# Patient Record
Sex: FEMALE | Race: WHITE | NOT HISPANIC OR LATINO | Employment: OTHER | URBAN - METROPOLITAN AREA
[De-identification: names, ages, dates, MRNs, and addresses within clinical notes are randomized per-mention and may not be internally consistent; named-entity substitution may affect disease eponyms.]

---

## 2020-09-21 ENCOUNTER — APPOINTMENT (OUTPATIENT)
Dept: PREADMISSION TESTING | Facility: HOSPITAL | Age: 65
End: 2020-09-21

## 2020-09-21 PROCEDURE — U0004 COV-19 TEST NON-CDC HGH THRU: HCPCS

## 2020-09-21 PROCEDURE — C9803 HOPD COVID-19 SPEC COLLECT: HCPCS

## 2020-09-22 LAB — SARS-COV-2 RNA NOSE QL NAA+PROBE: NOT DETECTED

## 2021-01-05 ENCOUNTER — OFFICE VISIT (OUTPATIENT)
Dept: ORTHOPEDIC SURGERY | Facility: CLINIC | Age: 66
End: 2021-01-05

## 2021-01-05 VITALS — BODY MASS INDEX: 38.95 KG/M2 | HEIGHT: 63 IN | OXYGEN SATURATION: 96 % | HEART RATE: 80 BPM | WEIGHT: 219.8 LBS

## 2021-01-05 DIAGNOSIS — M25.571 RIGHT ANKLE PAIN, UNSPECIFIED CHRONICITY: Primary | ICD-10-CM

## 2021-01-05 DIAGNOSIS — M19.171 POST-TRAUMATIC ARTHRITIS OF ANKLE, RIGHT: ICD-10-CM

## 2021-01-05 DIAGNOSIS — T84.84XA PAINFUL ORTHOPAEDIC HARDWARE (HCC): ICD-10-CM

## 2021-01-05 DIAGNOSIS — R25.2 MUSCLE CRAMPING: ICD-10-CM

## 2021-01-05 PROCEDURE — 99204 OFFICE O/P NEW MOD 45 MIN: CPT | Performed by: PHYSICIAN ASSISTANT

## 2021-01-05 RX ORDER — OMEPRAZOLE 20 MG/1
20 CAPSULE, DELAYED RELEASE ORAL DAILY
Status: ON HOLD | COMMUNITY
End: 2022-06-27

## 2021-01-05 RX ORDER — IRBESARTAN 75 MG/1
75 TABLET ORAL NIGHTLY
COMMUNITY
End: 2023-03-31

## 2021-01-05 RX ORDER — QUETIAPINE FUMARATE 25 MG/1
75 TABLET, FILM COATED ORAL NIGHTLY
COMMUNITY

## 2021-01-05 RX ORDER — PERPHENAZINE 16 MG
TABLET ORAL
COMMUNITY
End: 2021-01-05

## 2021-01-05 RX ORDER — GABAPENTIN 300 MG/1
300 CAPSULE ORAL 3 TIMES DAILY
Status: ON HOLD | COMMUNITY
End: 2022-06-27

## 2021-01-05 RX ORDER — ATORVASTATIN CALCIUM 10 MG/1
10 TABLET, FILM COATED ORAL DAILY
COMMUNITY

## 2021-01-05 NOTE — PROGRESS NOTES
[facility] Note    Subjective     Patient: Perla Adair  : 1955    Primary Care Provider: Provider, No Known    Requesting Provider: As above    Pain of the Left Ankle, Pain of the Left Foot, and Pain of the Right Ankle      History    Chief Complaint: Right ankle pain and left ankle and foot pain    History of Present Illness: This very pleasant 65-year-old female presenting today to discuss her right ankle pain and left foot and ankle and calf pain and cramping.  She is status post ORIF right ankle fracture in [address] in 2016.  She reports that the hardware bothers her.  She also complains of generalized ankle pain with weightbearing and walking.  No rest pain or pain.  She describes it as aching.  She denies any swelling warmth or erythema.  She is here for further evaluation.  She has had no prior treatment.    .  She also complains of left foot ankle and calf cramp cramping.  She is status post left posterior tib tendon repair in 2016 in [facility].  She reports that this began several months ago.  It bothers her most when she is lying in bed at night.  She has to get up and walk around for it to resolve.  She denies any swelling.  She reports that she feels like her foot is pulled me medially and if she stretches it helps to resolve it.  She is here for further evaluation    Current Outpatient Medications on File Prior to Visit   Medication Sig Dispense Refill   • atorvastatin (LIPITOR) 10 MG tablet Take 10 mg by mouth Daily.     • Escitalopram Oxalate (LEXAPRO PO) Take  by mouth.     • gabapentin (NEURONTIN) 300 MG capsule Take 300 mg by mouth 3 (Three) Times a Day.     • irbesartan (AVAPRO) 75 MG tablet Take 75 mg by mouth Every Night.     • METHOCARBAMOL PO Take  by mouth.     • omeprazole (priLOSEC) 20 MG capsule Take 20 mg by mouth Daily.     • PROPRANOLOL HCL ER PO Take  by mouth.     • QUEtiapine (SEROquel) 25 MG tablet Take 25 mg by mouth Every  Night.       No current facility-administered medications on file prior to visit.       Allergies   Allergen Reactions   • Adhesive Tape Rash   • Amoxicillin-Pot Clavulanate Nausea And Vomiting   • Butorphanol Other (See Comments)     Chest pain   • Codeine Other (See Comments)     Chest pain     • Morphine Other (See Comments)     Chest pain      History reviewed. No pertinent past medical history.  Past Surgical History:   Procedure Laterality Date   • ANKLE OPEN REDUCTION INTERNAL FIXATION Bilateral    • APPENDECTOMY     • CHOLECYSTECTOMY     • FINGER SURGERY      Re attachment Left thumb   • HYSTERECTOMY     • JOINT REPLACEMENT Left     knee   • LUNG LOBECTOMY      right upper portion   • SHOULDER SURGERY      RCR - 3 Right  1 left     Family History   Problem Relation Age of Onset   • Cancer Mother    • Diabetes Mother    • Hypertension Mother    • Heart attack Mother       Social History     Socioeconomic History   • Marital status: Unknown     Spouse name: Not on file   • Number of children: Not on file   • Years of education: Not on file   • Highest education level: Not on file   Tobacco Use   • Smoking status: Former Smoker     Packs/day: 0.25     Years: 20.00     Pack years: 5.00     Types: Cigarettes     Start date:      Quit date:      Years since quittin.0   • Smokeless tobacco: Never Used   Substance and Sexual Activity   • Alcohol use: Yes     Comment: twice a month   • Drug use: Never   • Sexual activity: Defer        Review of Systems   Constitutional: Negative.         Night sweats   HENT: Positive for dental problem and tinnitus.    Eyes: Positive for visual disturbance.   Respiratory: Positive for apnea (CPAP), shortness of breath and wheezing.    Cardiovascular: Positive for leg swelling.   Gastrointestinal: Negative.    Endocrine: Positive for cold intolerance.   Genitourinary: Negative.    Musculoskeletal: Positive for arthralgias and back pain.   Skin: Negative.   "  Allergic/Immunologic: Negative.    Neurological: Positive for headaches.   Hematological: Negative.    Psychiatric/Behavioral: Positive for sleep disturbance.        PTSD       The following portions of the patient's history were reviewed and updated as appropriate: allergies, current medications, past family history, past medical history, past social history, past surgical history and problem list.      Objective      Physical Exam  Pulse 80   Ht 160 cm (63\")   Wt 99.7 kg (219 lb 12.8 oz)   SpO2 96%   BMI 38.94 kg/m²     Body mass index is 38.94 kg/m².    GENERAL: Body habitus: obese    Lower extremity edema: Left: 1+ pitting; Right: 1+ pitting    Varicose veins:  Left: mild; Right: mild    Gait: antalgic     Mental Status:  awake and alert; oriented to person, place, and time    Voice:  clear  SKIN:  Normal    Hair Growth:  Right:normal; Left:  normal  HEENT: Head: Normocephalic, atraumatic,  without obvious abnormality.  eye: normal external eye, no icterus   PULM:  Repiratory effort normal    Ortho Exam  V:  Dorsalis Pedis:  Right: 2+; Left:2+    Posterior Tibial: Right:2+; Left:2+    Capillary Refill:  Brisk  MSK:      Tibia:  Right:  non tender; Left:  non tender      Ankle:  Right: tender Over the prominent lateral hardware with well-healed incision.  She has no joint tenderness.  Reasonable range of motion 5/5 motor strength; Left:  non tender and With well-healed incision.  Nontender with no swelling or erythema.      Foot:  Right:  non tender; Left:  tender over the origin of the plantar fascia    [place] 5.07 monofilament test: not evaluated    Lower extremity sensation: intact     Calf Atrophy:none    Motor Function: all 5/5          Medical Decision Making    Data Review:   ordered and reviewed x-rays today and reviewed outside records    Assessment:  1. Right ankle pain, unspecified chronicity    2. Muscle cramping    3. Painful orthopaedic hardware (CMS/HCC)    4. Post-traumatic " arthritis of ankle, right        Plan:  1.  Painful orthopedic hardware status post ORIF right ankle fracture in 2016.  I reviewed today's x-rays and clinical findings with the patient.  X-rays today show demonstrate patient is status post ORIF right ankle fracture with fibular plate and tight rope placed for syndesmotic injury.  Ankle mortise is symmetric.  There is evidence of small periarticular osteophytes indicative of posttraumatic arthritis of the ankle.  No acute bony pathology.  She reports she would like to have the ankle hardware removed.  She will return to see Dr. Narvaez in several weeks should further discuss hardware removal as well as follow-up for her muscle cramping and mild arthritis.    2.  Mild right posttraumatic arthritis.  X-rays show evidence of mild arthritis.  I think this is what is causing her generalized ankle pain and aching when she is weightbearing and walking.  There is no evidence of any acute bony findings.  We discussed further treatment options including anti-inflammatories and eventually possible steroid injection if becomes painful enough.  She does not want any further treatment at this point.    3.  Left muscle cramping in the lower extremity and plantar fasciitis.  Patient is nontender with normal range of motion.  Patient is complaining of painful cramping at night that seems to improve with getting up and stretching.  I encouraged her to continue with stretching of her calf and her feet.  I have also given her the 3 stretches to do for plantar fasciitis encouraging her to stretch as much as possible.  She will also begin using a night splint.  Return to see Dr. Narvaez in 4 to 6 weeks to see how she has progressed or sooner if needed.    History, diagnosis and treatment plan discussed with Dr. Narvaez.          Laure Leslie PA-C  01/07/21  13:38 EST

## 2022-05-09 ENCOUNTER — OFFICE VISIT (OUTPATIENT)
Dept: NEUROLOGY | Facility: CLINIC | Age: 67
End: 2022-05-09

## 2022-05-09 DIAGNOSIS — G44.229 CHRONIC TENSION-TYPE HEADACHE, NOT INTRACTABLE: ICD-10-CM

## 2022-05-09 DIAGNOSIS — G43.719 INTRACTABLE CHRONIC MIGRAINE WITHOUT AURA AND WITHOUT STATUS MIGRAINOSUS: Primary | ICD-10-CM

## 2022-05-09 PROCEDURE — 99204 OFFICE O/P NEW MOD 45 MIN: CPT | Performed by: PSYCHIATRY & NEUROLOGY

## 2022-05-09 RX ORDER — ATOGEPANT 60 MG/1
60 TABLET ORAL DAILY
Qty: 30 TABLET | Refills: 6 | Status: SHIPPED | OUTPATIENT
Start: 2022-05-09 | End: 2022-06-08

## 2022-05-09 RX ORDER — NAPHAZOLINE HCL 0.012 %
2 DROPS OPHTHALMIC (EYE) EVERY 6 HOURS PRN
COMMUNITY

## 2022-05-09 RX ORDER — FEXOFENADINE HCL 180 MG/1
180 TABLET ORAL DAILY
COMMUNITY

## 2022-05-09 RX ORDER — OMEPRAZOLE 40 MG/1
40 CAPSULE, DELAYED RELEASE ORAL 2 TIMES DAILY
COMMUNITY

## 2022-05-09 RX ORDER — FROVATRIPTAN SUCCINATE 2.5 MG/1
2.5 TABLET, FILM COATED ORAL ONCE AS NEEDED
Qty: 10 TABLET | Refills: 3 | Status: SHIPPED | OUTPATIENT
Start: 2022-05-09

## 2022-05-09 RX ORDER — VENLAFAXINE HYDROCHLORIDE 225 MG/1
225 TABLET, EXTENDED RELEASE ORAL
COMMUNITY
End: 2023-03-31

## 2022-05-09 RX ORDER — ESCITALOPRAM OXALATE 20 MG/1
20 TABLET ORAL DAILY
COMMUNITY
Start: 2022-05-08

## 2022-05-09 NOTE — PROGRESS NOTES
Subjective:    CC: Perla Adair is seen today in consultation at the request of JOHNATHAN Manzanares for Migraine (Triggered by weather and stress/Located all over head, not specific to any one area)       HPI:  Patient is a 66-year-old female with history of migraines and tension headaches referred to the clinic to establish care for migraine management.  She reports that she has had migraines since age 10.  She reports that she has tried several migraine preventative treatments including amitriptyline, nortriptyline, Topamax, Depakote, Aimovig, Ajovy, Emgality but none of them have helped.  She has also tried Botox x3 which actually made her migraines worse.  She currently reports on an average 15 migraine days in a month.  In addition to this, she has nonmigraine type tension headaches occurring almost on a daily basis.  She reports that she has associated photophobia, phonophobia and nausea associated with migraines.  Nonmigraine type tension headaches are mild to moderate in intensity and she usually wakes up with this type of headache.  She usually can carry on with her routine with this type of headache.  She is currently taking Maxalt 10 mg as needed as an abortive treatment which sometimes works and sometimes does not.  She has tried and failed Imitrex, Relpax, Zomig.  She has also tried and failed Ubrelvy and Nurtec.  She reports that she was given some samples of Qulipta 60 mg to be taken every day which she tried for a month and it did help reduce migraine intensity and frequency however insurance did not approve Qulipta.    The following portions of the patient's history were reviewed today and updated as of 05/09/2022  : allergies, social history and problem list.  This document will be scanned to patient's chart.      Current Outpatient Medications:   •  aspirin-acetaminophen-caffeine (Excedrin Extra Strength) 250-250-65 MG per tablet, Excedrin Extra Strength, Disp: , Rfl:   •  atorvastatin (LIPITOR)  10 MG tablet, Take 10 mg by mouth Daily., Disp: , Rfl:   •  escitalopram (LEXAPRO) 20 MG tablet, 20 mg., Disp: , Rfl:   •  Escitalopram Oxalate (LEXAPRO PO), Take  by mouth., Disp: , Rfl:   •  fexofenadine (ALLEGRA) 180 MG tablet, Allergy Relief (fexofenadine) 180 mg tablet, Disp: , Rfl:   •  gabapentin (NEURONTIN) 300 MG capsule, Take 300 mg by mouth 3 (Three) Times a Day., Disp: , Rfl:   •  irbesartan (AVAPRO) 75 MG tablet, Take 75 mg by mouth Every Night., Disp: , Rfl:   •  METHOCARBAMOL PO, Take  by mouth., Disp: , Rfl:   •  omeprazole (priLOSEC) 20 MG capsule, Take 20 mg by mouth Daily., Disp: , Rfl:   •  omeprazole (priLOSEC) 40 MG capsule, Prilosec 40 mg capsule,delayed release  Take 1 capsule every day by oral route., Disp: , Rfl:   •  PROPRANOLOL HCL ER PO, Take  by mouth., Disp: , Rfl:   •  QUEtiapine (SEROquel) 25 MG tablet, Take 25 mg by mouth Every Night., Disp: , Rfl:   •  venlafaxine 225 MG tablet sustained-release 24 hour 24 hr tablet, venlafaxine  mg tablet,extended release 24 hr  TAKE 1 TABLET DAILY, Disp: , Rfl:   •  Atogepant (Qulipta) 60 MG tablet, Take 1 tablet by mouth Daily for 30 days., Disp: 30 tablet, Rfl: 6  •  frovatriptan (Frova) 2.5 MG tablet, Take 1 tablet by mouth 1 (One) Time As Needed for Migraine for up to 30 doses. If recurs, may repeat after 2 hours. Max of 3 tabs in 24 hours., Disp: 10 tablet, Rfl: 3   Past Medical History:   Diagnosis Date   • Arthritis    • Asthma    • CTS (carpal tunnel syndrome)    • Depression    • Dizziness    • Fractures    • Headache, tension-type    • Hx of bladder problems    • Hypertension    • Lung cancer (HCC) 05/2016   • Migraine    • Shingles    • Sleep apnea    • Stomach problems    • Syncope       Past Surgical History:   Procedure Laterality Date   • ANKLE OPEN REDUCTION INTERNAL FIXATION Bilateral    • APPENDECTOMY     • CHOLECYSTECTOMY     • FINGER SURGERY      Re attachment Left thumb   • HYSTERECTOMY     • JOINT REPLACEMENT Left      knee   • LUNG LOBECTOMY      right upper portion   • SHOULDER SURGERY      RCR - 3 Right  1 left      Family History   Problem Relation Age of Onset   • Cancer Mother    • Diabetes Mother    • Hypertension Mother    • Heart attack Mother    • Colon cancer Mother    • Heart disease Mother    • Neuropathy Mother    • Obesity Mother    • Alcohol abuse Sister    • Colon cancer Sister    • Dementia Sister    • Heart disease Sister    • Migraine headaches Sister    • Obesity Sister    • Osteosclerosis Sister    • Alcohol abuse Brother    • Alzheimer's disease Brother    • Stomach cancer Brother    • Dementia Brother    • Heart disease Brother    • Mental illness Brother    • Neuropathy Brother    • Obesity Brother    • Suicidality Brother       Review of Systems    All other systems reviewed and are negative     Objective:    There were no vitals taken for this visit.    Neurology Exam:    General apperance: NAD.     Mental status: Alert, awake and oriented to time place and person.    Recent and Remote memory: Can recall 3/3 objects at 5 minutes. Can recall historical events.     Attention span and Concentration: Serial 7s: Normal.     Fund of knowledge:  Normal.     Language and Speech: No aphasia or dysarthria.    Naming , Repitition and Comprehension:  Can name objects, repeat a sentence and follow commands. Speech is clear and fluent with good repetition, comprehension, and naming.    Cranial Nerves:   CN II: Visual fields are full. Intact. Fundi - Normal, No papillederma, Pupils - TL  CN III, IV and VI: Extraocular movements are intact. Normal saccades.   CN V: Facial sensation is intact.   CN VII: Muscles of facial expression reveal no asymmetry. Intact.   CN VIII: Hearing is intact. Whispered voice intact.   CN IX and X: Palate elevates symmetrically. Intact  CN XI: Shoulder shrug is intact.   CN XII: Tongue is midline without evidence of atrophy or fasciculation.     Motor:  Right UE muscle strength 5/5.  Normal tone.     Left UE muscle strength 5/5. Normal tone.      Right LE muscle strength5/5. Normal tone.     Left LE muscle strength 5/5. Normal tone.      Sensory: Normal light touch, vibration and pinprick sensation bilaterally.    DTRs: 2+ bilaterally in upper and lower extremities.    Babinski: Negative bilaterally.    Co-ordination: Normal finger-to-nose, heel to shin B/L.    Rhomberg: Negative.    Gait: Normal.    Cardiovascular: Regular rate and rhythm without murmur, gallop or rub.    Ophthalmoscopic exam: Normal fundi, no papilledema.    Assessment and Plan:  1. Intractable chronic migraine without aura and without status migrainosus  2. Chronic tension-type headache, not intractable  Patient with long-term history of migraines.  She has chronic migraines and chronic tension headaches.  She has tried and failed multiple oral putative treatments including amitriptyline, nortriptyline, Topamax, Depakote.  She has tried and failed monthly Aimovig, Ajovy and Emgality injections as well.  She has tried and failed Botox in the past.  He was given a 1 month sample of Qulipta recently and it did help however insurance did not approve it.  I am going to resend the prescription to [employer] for approval.  It is medically necessary for patient to be on Qulipta as she has tried and failed all conventional migraine preventative treatments.  I also prescribed frovatriptan as she has tried and failed Imitrex, Maxalt, Zomig, Ubrelvy and Nurtec for migraine abortive treatment.  She is currently on Seroquel 50 mg daily which helps sometimes with sleep and sometimes it does not.  I have advised her to talk to her primary care physician with regards to this and if it can be increased to 75 mg nightly dose then it may help her sleep consistently well at night and in turn reduce daily tension type headaches.  I will plan to see her back in clinic in 6 weeks for follow-up.    Return in about 6 weeks (around 6/20/2022).      Alvarez Morrison MD

## 2022-05-11 ENCOUNTER — PRIOR AUTHORIZATION (OUTPATIENT)
Dept: NEUROLOGY | Facility: CLINIC | Age: 67
End: 2022-05-11

## 2022-05-17 ENCOUNTER — TRANSCRIBE ORDERS (OUTPATIENT)
Dept: LAB | Facility: HOSPITAL | Age: 67
End: 2022-05-17

## 2022-05-17 ENCOUNTER — LAB (OUTPATIENT)
Dept: LAB | Facility: HOSPITAL | Age: 67
End: 2022-05-17

## 2022-05-17 DIAGNOSIS — M25.512 LEFT SHOULDER PAIN, UNSPECIFIED CHRONICITY: ICD-10-CM

## 2022-05-17 DIAGNOSIS — M25.512 LEFT SHOULDER PAIN, UNSPECIFIED CHRONICITY: Primary | ICD-10-CM

## 2022-05-17 DIAGNOSIS — Z47.1 AFTERCARE FOLLOWING JOINT REPLACEMENT SURGERY, UNSPECIFIED JOINT: ICD-10-CM

## 2022-05-17 LAB
BASOPHILS # BLD AUTO: 0.1 10*3/MM3 (ref 0–0.2)
BASOPHILS NFR BLD AUTO: 0.9 % (ref 0–1.5)
CRP SERPL-MCNC: 2.94 MG/DL (ref 0–0.5)
DEPRECATED RDW RBC AUTO: 46.4 FL (ref 37–54)
EOSINOPHIL # BLD AUTO: 0.14 10*3/MM3 (ref 0–0.4)
EOSINOPHIL NFR BLD AUTO: 1.2 % (ref 0.3–6.2)
ERYTHROCYTE [DISTWIDTH] IN BLOOD BY AUTOMATED COUNT: 14.3 % (ref 12.3–15.4)
ERYTHROCYTE [SEDIMENTATION RATE] IN BLOOD: 41 MM/HR (ref 0–30)
HCT VFR BLD AUTO: 40.9 % (ref 34–46.6)
HGB BLD-MCNC: 13.5 G/DL (ref 12–15.9)
IMM GRANULOCYTES # BLD AUTO: 0.03 10*3/MM3 (ref 0–0.05)
IMM GRANULOCYTES NFR BLD AUTO: 0.3 % (ref 0–0.5)
LYMPHOCYTES # BLD AUTO: 2.47 10*3/MM3 (ref 0.7–3.1)
LYMPHOCYTES NFR BLD AUTO: 21.6 % (ref 19.6–45.3)
MCH RBC QN AUTO: 29.8 PG (ref 26.6–33)
MCHC RBC AUTO-ENTMCNC: 33 G/DL (ref 31.5–35.7)
MCV RBC AUTO: 90.3 FL (ref 79–97)
MONOCYTES # BLD AUTO: 1.02 10*3/MM3 (ref 0.1–0.9)
MONOCYTES NFR BLD AUTO: 8.9 % (ref 5–12)
NEUTROPHILS NFR BLD AUTO: 67.1 % (ref 42.7–76)
NEUTROPHILS NFR BLD AUTO: 7.66 10*3/MM3 (ref 1.7–7)
NRBC BLD AUTO-RTO: 0 /100 WBC (ref 0–0.2)
PLATELET # BLD AUTO: 341 10*3/MM3 (ref 140–450)
PMV BLD AUTO: 9.7 FL (ref 6–12)
RBC # BLD AUTO: 4.53 10*6/MM3 (ref 3.77–5.28)
WBC NRBC COR # BLD: 11.42 10*3/MM3 (ref 3.4–10.8)

## 2022-05-17 PROCEDURE — 85025 COMPLETE CBC W/AUTO DIFF WBC: CPT

## 2022-05-17 PROCEDURE — 85652 RBC SED RATE AUTOMATED: CPT

## 2022-05-17 PROCEDURE — 36415 COLL VENOUS BLD VENIPUNCTURE: CPT

## 2022-05-17 PROCEDURE — 86140 C-REACTIVE PROTEIN: CPT

## 2022-05-24 ENCOUNTER — TRANSCRIBE ORDERS (OUTPATIENT)
Dept: ADMINISTRATIVE | Facility: HOSPITAL | Age: 67
End: 2022-05-24

## 2022-05-24 DIAGNOSIS — Z96.612 PRESENCE OF LEFT ARTIFICIAL SHOULDER JOINT: Primary | ICD-10-CM

## 2022-05-31 ENCOUNTER — HOSPITAL ENCOUNTER (OUTPATIENT)
Dept: GENERAL RADIOLOGY | Facility: HOSPITAL | Age: 67
Discharge: HOME | End: 2022-05-31
Admitting: RADIOLOGY

## 2022-05-31 DIAGNOSIS — Z96.612 PRESENCE OF LEFT ARTIFICIAL SHOULDER JOINT: ICD-10-CM

## 2022-05-31 LAB
APPEARANCE FLD: ABNORMAL
COLOR FLD: ABNORMAL
CRYSTALS FLD MICRO: NORMAL
EOSINOPHIL NFR FLD MANUAL: 1 %
LYMPHOCYTES NFR FLD MANUAL: 24 %
MONOCYTES NFR FLD: 5 %
NEUTROPHILS NFR FLD MANUAL: 70 %
RBC # FLD AUTO: ABNORMAL /MM3
WBC # FLD AUTO: 23 /MM3

## 2022-05-31 PROCEDURE — 87102 FUNGUS ISOLATION CULTURE: CPT | Performed by: ORTHOPAEDIC SURGERY

## 2022-05-31 PROCEDURE — 89060 EXAM SYNOVIAL FLUID CRYSTALS: CPT | Performed by: ORTHOPAEDIC SURGERY

## 2022-05-31 PROCEDURE — 87205 SMEAR GRAM STAIN: CPT | Performed by: ORTHOPAEDIC SURGERY

## 2022-05-31 PROCEDURE — 89051 BODY FLUID CELL COUNT: CPT | Performed by: ORTHOPAEDIC SURGERY

## 2022-05-31 PROCEDURE — 87075 CULTR BACTERIA EXCEPT BLOOD: CPT | Performed by: ORTHOPAEDIC SURGERY

## 2022-05-31 PROCEDURE — 87206 SMEAR FLUORESCENT/ACID STAI: CPT | Performed by: ORTHOPAEDIC SURGERY

## 2022-05-31 PROCEDURE — 77002 NEEDLE LOCALIZATION BY XRAY: CPT

## 2022-05-31 PROCEDURE — 87070 CULTURE OTHR SPECIMN AEROBIC: CPT | Performed by: ORTHOPAEDIC SURGERY

## 2022-05-31 PROCEDURE — 0 LIDOCAINE 1 % SOLUTION: Performed by: PHYSICIAN ASSISTANT

## 2022-05-31 PROCEDURE — 87015 SPECIMEN INFECT AGNT CONCNTJ: CPT | Performed by: ORTHOPAEDIC SURGERY

## 2022-05-31 PROCEDURE — 87116 MYCOBACTERIA CULTURE: CPT | Performed by: ORTHOPAEDIC SURGERY

## 2022-05-31 RX ORDER — LIDOCAINE HYDROCHLORIDE 10 MG/ML
5 INJECTION, SOLUTION INFILTRATION; PERINEURAL ONCE
Status: COMPLETED | OUTPATIENT
Start: 2022-05-31 | End: 2022-05-31

## 2022-05-31 RX ADMIN — LIDOCAINE HYDROCHLORIDE 5 ML: 10 INJECTION, SOLUTION INFILTRATION; PERINEURAL at 09:01

## 2022-06-05 LAB
BACTERIA FLD CULT: NORMAL
GRAM STN SPEC: NORMAL
GRAM STN SPEC: NORMAL

## 2022-06-12 LAB — BACTERIA SPEC ANAEROBE CULT: ABNORMAL

## 2022-06-15 ENCOUNTER — TRANSCRIBE ORDERS (OUTPATIENT)
Dept: ADMINISTRATIVE | Facility: HOSPITAL | Age: 67
End: 2022-06-15

## 2022-06-15 DIAGNOSIS — S42.102A: Primary | ICD-10-CM

## 2022-06-17 ENCOUNTER — PRE-ADMISSION TESTING (OUTPATIENT)
Dept: PREADMISSION TESTING | Facility: HOSPITAL | Age: 67
End: 2022-06-17

## 2022-06-17 ENCOUNTER — HOSPITAL ENCOUNTER (OUTPATIENT)
Dept: GENERAL RADIOLOGY | Facility: HOSPITAL | Age: 67
Discharge: HOME | End: 2022-06-17

## 2022-06-17 ENCOUNTER — HOSPITAL ENCOUNTER (OUTPATIENT)
Dept: CT IMAGING | Facility: HOSPITAL | Age: 67
Discharge: HOME | End: 2022-06-17

## 2022-06-17 VITALS — BODY MASS INDEX: 38.7 KG/M2 | WEIGHT: 210.32 LBS | HEIGHT: 62 IN

## 2022-06-17 DIAGNOSIS — S42.102A: ICD-10-CM

## 2022-06-17 LAB
ANION GAP SERPL CALCULATED.3IONS-SCNC: 8 MMOL/L (ref 5–15)
BUN SERPL-MCNC: 17 MG/DL (ref 8–23)
BUN/CREAT SERPL: 23.3 (ref 7–25)
CALCIUM SPEC-SCNC: 9.7 MG/DL (ref 8.6–10.5)
CHLORIDE SERPL-SCNC: 104 MMOL/L (ref 98–107)
CO2 SERPL-SCNC: 28 MMOL/L (ref 22–29)
CREAT SERPL-MCNC: 0.73 MG/DL (ref 0.57–1)
CRP SERPL-MCNC: 2.81 MG/DL (ref 0–0.5)
DEPRECATED RDW RBC AUTO: 46.6 FL (ref 37–54)
EGFRCR SERPLBLD CKD-EPI 2021: 91.4 ML/MIN/1.73
ERYTHROCYTE [DISTWIDTH] IN BLOOD BY AUTOMATED COUNT: 14.6 % (ref 12.3–15.4)
ERYTHROCYTE [SEDIMENTATION RATE] IN BLOOD: 30 MM/HR (ref 0–30)
GLUCOSE SERPL-MCNC: 84 MG/DL (ref 65–99)
HBA1C MFR BLD: 5.1 % (ref 4.8–5.6)
HCT VFR BLD AUTO: 41 % (ref 34–46.6)
HGB BLD-MCNC: 13.8 G/DL (ref 12–15.9)
MCH RBC QN AUTO: 29.6 PG (ref 26.6–33)
MCHC RBC AUTO-ENTMCNC: 33.7 G/DL (ref 31.5–35.7)
MCV RBC AUTO: 88 FL (ref 79–97)
PLATELET # BLD AUTO: 285 10*3/MM3 (ref 140–450)
PMV BLD AUTO: 10.2 FL (ref 6–12)
POTASSIUM SERPL-SCNC: 4.5 MMOL/L (ref 3.5–5.2)
QT INTERVAL: 396 MS
QTC INTERVAL: 433 MS
RBC # BLD AUTO: 4.66 10*6/MM3 (ref 3.77–5.28)
SODIUM SERPL-SCNC: 140 MMOL/L (ref 136–145)
WBC NRBC COR # BLD: 10.37 10*3/MM3 (ref 3.4–10.8)

## 2022-06-17 PROCEDURE — 36415 COLL VENOUS BLD VENIPUNCTURE: CPT

## 2022-06-17 PROCEDURE — 71046 X-RAY EXAM CHEST 2 VIEWS: CPT

## 2022-06-17 PROCEDURE — 73200 CT UPPER EXTREMITY W/O DYE: CPT

## 2022-06-17 PROCEDURE — 80048 BASIC METABOLIC PNL TOTAL CA: CPT

## 2022-06-17 PROCEDURE — 93005 ELECTROCARDIOGRAM TRACING: CPT

## 2022-06-17 PROCEDURE — 86140 C-REACTIVE PROTEIN: CPT

## 2022-06-17 PROCEDURE — 85652 RBC SED RATE AUTOMATED: CPT

## 2022-06-17 PROCEDURE — 93010 ELECTROCARDIOGRAM REPORT: CPT | Performed by: INTERNAL MEDICINE

## 2022-06-17 PROCEDURE — 85027 COMPLETE CBC AUTOMATED: CPT

## 2022-06-17 PROCEDURE — 83036 HEMOGLOBIN GLYCOSYLATED A1C: CPT

## 2022-06-17 NOTE — PAT
Patient did not review general PAT education video as instructed in their preoperative information received from their surgeon.  One-on-one Pre Admission Testing general education provided during PAT visit.  Copies of PAT general education handouts (Incentive Spirometry, Meds to Beds Program, Patient Belongings, Pre-op skin preparation instructions, Blood Glucose testing, Visitor policy, Surgery FAQ, Code H) distributed to patient. Encouraged patient/family to read PAT general education handouts thoroughly and notify PAT staff with any questions or concerns. Patient instructed to bring PAT pass and completed skin prep sheet (if applicable) on the day of procedure. Patient verbalized understanding of all information and priority content.     Pt allergic to Chlorhexidine Skin Wipes, given ReadyBath Luxe Wipes.     Patient instructed to drink 20 ounces (or until full) of Gatorade and it needs to be completed 1 hour (for Main OR patients) or 2 hours (scheduled  section patients) before given arrival time for procedure (NO RED Gatorade)    Patient verbalized understanding.    Covid test scheduled 2022.    Patient directed to Radiology Department for CXR after Pre Admission Testing Appointment.

## 2022-06-24 ENCOUNTER — CLINICAL SUPPORT NO REQUIREMENTS (OUTPATIENT)
Dept: PREADMISSION TESTING | Facility: HOSPITAL | Age: 67
End: 2022-06-24

## 2022-06-24 ENCOUNTER — ANESTHESIA EVENT (OUTPATIENT)
Dept: PERIOP | Facility: HOSPITAL | Age: 67
End: 2022-06-24

## 2022-06-24 PROCEDURE — U0004 COV-19 TEST NON-CDC HGH THRU: HCPCS

## 2022-06-24 PROCEDURE — U0005 INFEC AGEN DETEC AMPLI PROBE: HCPCS

## 2022-06-24 PROCEDURE — C9803 HOPD COVID-19 SPEC COLLECT: HCPCS

## 2022-06-24 RX ORDER — SODIUM CHLORIDE 0.9 % (FLUSH) 0.9 %
10 SYRINGE (ML) INJECTION AS NEEDED
Status: CANCELLED | OUTPATIENT
Start: 2022-06-24

## 2022-06-24 RX ORDER — SODIUM CHLORIDE 0.9 % (FLUSH) 0.9 %
10 SYRINGE (ML) INJECTION EVERY 12 HOURS SCHEDULED
Status: CANCELLED | OUTPATIENT
Start: 2022-06-24

## 2022-06-24 RX ORDER — FAMOTIDINE 10 MG/ML
20 INJECTION, SOLUTION INTRAVENOUS ONCE
Status: CANCELLED | OUTPATIENT
Start: 2022-06-24 | End: 2022-06-24

## 2022-06-25 LAB — SARS-COV-2 RNA PNL SPEC NAA+PROBE: NOT DETECTED

## 2022-06-27 ENCOUNTER — APPOINTMENT (OUTPATIENT)
Dept: GENERAL RADIOLOGY | Facility: HOSPITAL | Age: 67
End: 2022-06-27

## 2022-06-27 ENCOUNTER — HOSPITAL ENCOUNTER (OUTPATIENT)
Facility: HOSPITAL | Age: 67
Discharge: HOME | End: 2022-06-28
Attending: ORTHOPAEDIC SURGERY | Admitting: ORTHOPAEDIC SURGERY

## 2022-06-27 ENCOUNTER — ANESTHESIA (OUTPATIENT)
Dept: PERIOP | Facility: HOSPITAL | Age: 67
End: 2022-06-27

## 2022-06-27 ENCOUNTER — ANESTHESIA EVENT CONVERTED (OUTPATIENT)
Dept: ANESTHESIOLOGY | Facility: HOSPITAL | Age: 67
End: 2022-06-27

## 2022-06-27 DIAGNOSIS — M25.512 LEFT SHOULDER PAIN: ICD-10-CM

## 2022-06-27 PROBLEM — K21.9 GERD (GASTROESOPHAGEAL REFLUX DISEASE): Status: ACTIVE | Noted: 2022-06-27

## 2022-06-27 PROBLEM — G47.33 OSA (OBSTRUCTIVE SLEEP APNEA): Status: ACTIVE | Noted: 2022-06-27

## 2022-06-27 PROBLEM — E66.9 OBESITY (BMI 30-39.9): Status: ACTIVE | Noted: 2022-06-27

## 2022-06-27 PROBLEM — I10 HTN (HYPERTENSION): Status: ACTIVE | Noted: 2022-06-27

## 2022-06-27 PROBLEM — E78.5 HYPERLIPIDEMIA: Status: ACTIVE | Noted: 2022-06-27

## 2022-06-27 PROBLEM — Z98.890 S/P ORIF (OPEN REDUCTION INTERNAL FIXATION) FRACTURE: Status: ACTIVE | Noted: 2022-06-27

## 2022-06-27 PROBLEM — Z87.81 S/P ORIF (OPEN REDUCTION INTERNAL FIXATION) FRACTURE: Status: ACTIVE | Noted: 2022-06-27

## 2022-06-27 PROCEDURE — 87205 SMEAR GRAM STAIN: CPT | Performed by: ORTHOPAEDIC SURGERY

## 2022-06-27 PROCEDURE — 73030 X-RAY EXAM OF SHOULDER: CPT

## 2022-06-27 PROCEDURE — 63710000001 LOSARTAN 25 MG TABLET: Performed by: INTERNAL MEDICINE

## 2022-06-27 PROCEDURE — 87015 SPECIMEN INFECT AGNT CONCNTJ: CPT | Performed by: ORTHOPAEDIC SURGERY

## 2022-06-27 PROCEDURE — 25010000002 ROPIVACAINE PER 1 MG: Performed by: NURSE ANESTHETIST, CERTIFIED REGISTERED

## 2022-06-27 PROCEDURE — 63710000001 ATORVASTATIN 10 MG TABLET: Performed by: INTERNAL MEDICINE

## 2022-06-27 PROCEDURE — A9270 NON-COVERED ITEM OR SERVICE: HCPCS | Performed by: INTERNAL MEDICINE

## 2022-06-27 PROCEDURE — C1713 ANCHOR/SCREW BN/BN,TIS/BN: HCPCS | Performed by: ORTHOPAEDIC SURGERY

## 2022-06-27 PROCEDURE — 87070 CULTURE OTHR SPECIMN AEROBIC: CPT | Performed by: ORTHOPAEDIC SURGERY

## 2022-06-27 PROCEDURE — 87102 FUNGUS ISOLATION CULTURE: CPT | Performed by: ORTHOPAEDIC SURGERY

## 2022-06-27 PROCEDURE — 25010000002 ONDANSETRON PER 1 MG: Performed by: ANESTHESIOLOGY

## 2022-06-27 PROCEDURE — 76000 FLUOROSCOPY <1 HR PHYS/QHP: CPT

## 2022-06-27 PROCEDURE — 63710000001 QUETIAPINE 25 MG TABLET: Performed by: INTERNAL MEDICINE

## 2022-06-27 PROCEDURE — 25010000002 DEXAMETHASONE PER 1 MG: Performed by: ANESTHESIOLOGY

## 2022-06-27 PROCEDURE — 25010000002 CEFAZOLIN IN DEXTROSE 2-4 GM/100ML-% SOLUTION: Performed by: ORTHOPAEDIC SURGERY

## 2022-06-27 PROCEDURE — 63710000001 CETIRIZINE 10 MG TABLET: Performed by: INTERNAL MEDICINE

## 2022-06-27 PROCEDURE — 25010000002 PROPOFOL 10 MG/ML EMULSION: Performed by: ANESTHESIOLOGY

## 2022-06-27 PROCEDURE — 87116 MYCOBACTERIA CULTURE: CPT | Performed by: ORTHOPAEDIC SURGERY

## 2022-06-27 PROCEDURE — G0378 HOSPITAL OBSERVATION PER HR: HCPCS

## 2022-06-27 PROCEDURE — 25010000002 FENTANYL CITRATE (PF) 50 MCG/ML SOLUTION: Performed by: NURSE ANESTHETIST, CERTIFIED REGISTERED

## 2022-06-27 PROCEDURE — 25010000002 VANCOMYCIN 10 G RECONSTITUTED SOLUTION: Performed by: ORTHOPAEDIC SURGERY

## 2022-06-27 PROCEDURE — 87176 TISSUE HOMOGENIZATION CULTR: CPT | Performed by: ORTHOPAEDIC SURGERY

## 2022-06-27 PROCEDURE — 87206 SMEAR FLUORESCENT/ACID STAI: CPT | Performed by: ORTHOPAEDIC SURGERY

## 2022-06-27 PROCEDURE — 25010000002 FENTANYL CITRATE (PF) 100 MCG/2ML SOLUTION: Performed by: ANESTHESIOLOGY

## 2022-06-27 PROCEDURE — 76942 ECHO GUIDE FOR BIOPSY: CPT | Performed by: ORTHOPAEDIC SURGERY

## 2022-06-27 PROCEDURE — 63710000001 ESCITALOPRAM 20 MG TABLET: Performed by: INTERNAL MEDICINE

## 2022-06-27 PROCEDURE — 87075 CULTR BACTERIA EXCEPT BLOOD: CPT | Performed by: ORTHOPAEDIC SURGERY

## 2022-06-27 PROCEDURE — 25010000002 HYDROMORPHONE PER 4 MG: Performed by: ORTHOPAEDIC SURGERY

## 2022-06-27 PROCEDURE — 25010000002 FENTANYL CITRATE (PF) 50 MCG/ML SOLUTION

## 2022-06-27 DEVICE — IMPLANTABLE DEVICE: Type: IMPLANTABLE DEVICE | Site: SCAPULA | Status: FUNCTIONAL

## 2022-06-27 DEVICE — SCRW LK S/TAP T8STRDRV 2.7X18MM: Type: IMPLANTABLE DEVICE | Site: SCAPULA | Status: FUNCTIONAL

## 2022-06-27 DEVICE — ALLOGRFT BONE VIVIGEN CELLUAR MATRX FORMABLE 5CC: Type: IMPLANTABLE DEVICE | Site: SCAPULA | Status: FUNCTIONAL

## 2022-06-27 DEVICE — SCRW CORT S/TAP 3.5X38MM: Type: IMPLANTABLE DEVICE | Site: SCAPULA | Status: FUNCTIONAL

## 2022-06-27 DEVICE — SCRW LK S/TAP T8STRDRV 2.7X20MM: Type: IMPLANTABLE DEVICE | Site: SCAPULA | Status: FUNCTIONAL

## 2022-06-27 DEVICE — SCRW LK S/TAP T8STRDRV 2.7X42MM: Type: IMPLANTABLE DEVICE | Site: SCAPULA | Status: FUNCTIONAL

## 2022-06-27 DEVICE — SCRW CORT S/TAP 3.5X32MM: Type: IMPLANTABLE DEVICE | Site: SCAPULA | Status: FUNCTIONAL

## 2022-06-27 DEVICE — SCRW CORT S/TAP 3.5X44MM: Type: IMPLANTABLE DEVICE | Site: SCAPULA | Status: FUNCTIONAL

## 2022-06-27 DEVICE — SCRW CORT S/TAP 3.5X36MM: Type: IMPLANTABLE DEVICE | Site: SCAPULA | Status: FUNCTIONAL

## 2022-06-27 RX ORDER — ACETAMINOPHEN, ASPIRIN AND CAFFEINE 250; 250; 65 MG/1; MG/1; MG/1
2 TABLET, FILM COATED ORAL EVERY 6 HOURS PRN
Status: DISCONTINUED | OUTPATIENT
Start: 2022-06-27 | End: 2022-06-28 | Stop reason: HOSPADM

## 2022-06-27 RX ORDER — TRANEXAMIC ACID 10 MG/ML
1000 INJECTION, SOLUTION INTRAVENOUS ONCE
Status: COMPLETED | OUTPATIENT
Start: 2022-06-27 | End: 2022-06-27

## 2022-06-27 RX ORDER — ACETAMINOPHEN 650 MG/1
650 SUPPOSITORY RECTAL EVERY 4 HOURS PRN
Status: DISCONTINUED | OUTPATIENT
Start: 2022-06-27 | End: 2022-06-28 | Stop reason: HOSPADM

## 2022-06-27 RX ORDER — NALOXONE HCL 0.4 MG/ML
0.1 VIAL (ML) INJECTION
Status: DISCONTINUED | OUTPATIENT
Start: 2022-06-27 | End: 2022-06-28 | Stop reason: HOSPADM

## 2022-06-27 RX ORDER — OXYCODONE HYDROCHLORIDE 5 MG/1
5 TABLET ORAL EVERY 4 HOURS PRN
Qty: 25 TABLET | Refills: 0 | Status: SHIPPED | OUTPATIENT
Start: 2022-06-27 | End: 2022-11-21 | Stop reason: HOSPADM

## 2022-06-27 RX ORDER — CEFAZOLIN SODIUM 2 G/100ML
2 INJECTION, SOLUTION INTRAVENOUS ONCE
Status: COMPLETED | OUTPATIENT
Start: 2022-06-27 | End: 2022-06-27

## 2022-06-27 RX ORDER — FENTANYL CITRATE 50 UG/ML
50 INJECTION, SOLUTION INTRAMUSCULAR; INTRAVENOUS
Status: DISCONTINUED | OUTPATIENT
Start: 2022-06-27 | End: 2022-06-27 | Stop reason: HOSPADM

## 2022-06-27 RX ORDER — LABETALOL HYDROCHLORIDE 5 MG/ML
10 INJECTION, SOLUTION INTRAVENOUS EVERY 4 HOURS PRN
Status: DISCONTINUED | OUTPATIENT
Start: 2022-06-27 | End: 2022-06-28 | Stop reason: HOSPADM

## 2022-06-27 RX ORDER — MIDAZOLAM HYDROCHLORIDE 1 MG/ML
1 INJECTION INTRAMUSCULAR; INTRAVENOUS
Status: DISCONTINUED | OUTPATIENT
Start: 2022-06-27 | End: 2022-06-27 | Stop reason: HOSPADM

## 2022-06-27 RX ORDER — ESMOLOL HYDROCHLORIDE 10 MG/ML
INJECTION INTRAVENOUS AS NEEDED
Status: DISCONTINUED | OUTPATIENT
Start: 2022-06-27 | End: 2022-06-27 | Stop reason: SURG

## 2022-06-27 RX ORDER — ROCURONIUM BROMIDE 10 MG/ML
INJECTION, SOLUTION INTRAVENOUS AS NEEDED
Status: DISCONTINUED | OUTPATIENT
Start: 2022-06-27 | End: 2022-06-27 | Stop reason: SURG

## 2022-06-27 RX ORDER — BUPIVACAINE HYDROCHLORIDE 2.5 MG/ML
INJECTION, SOLUTION EPIDURAL; INFILTRATION; INTRACAUDAL
Status: COMPLETED | OUTPATIENT
Start: 2022-06-27 | End: 2022-06-27

## 2022-06-27 RX ORDER — DEXAMETHASONE SODIUM PHOSPHATE 4 MG/ML
INJECTION, SOLUTION INTRA-ARTICULAR; INTRALESIONAL; INTRAMUSCULAR; INTRAVENOUS; SOFT TISSUE AS NEEDED
Status: DISCONTINUED | OUTPATIENT
Start: 2022-06-27 | End: 2022-06-27 | Stop reason: SURG

## 2022-06-27 RX ORDER — FENTANYL CITRATE 50 UG/ML
INJECTION, SOLUTION INTRAMUSCULAR; INTRAVENOUS
Status: COMPLETED
Start: 2022-06-27 | End: 2022-06-27

## 2022-06-27 RX ORDER — LOSARTAN POTASSIUM 25 MG/1
25 TABLET ORAL
Status: DISCONTINUED | OUTPATIENT
Start: 2022-06-27 | End: 2022-06-28 | Stop reason: HOSPADM

## 2022-06-27 RX ORDER — HYDROMORPHONE HYDROCHLORIDE 1 MG/ML
0.5 INJECTION, SOLUTION INTRAMUSCULAR; INTRAVENOUS; SUBCUTANEOUS
Status: DISCONTINUED | OUTPATIENT
Start: 2022-06-27 | End: 2022-06-28 | Stop reason: HOSPADM

## 2022-06-27 RX ORDER — QUETIAPINE FUMARATE 25 MG/1
75 TABLET, FILM COATED ORAL NIGHTLY
Status: DISCONTINUED | OUTPATIENT
Start: 2022-06-27 | End: 2022-06-28 | Stop reason: HOSPADM

## 2022-06-27 RX ORDER — ESCITALOPRAM OXALATE 20 MG/1
20 TABLET ORAL DAILY
Status: DISCONTINUED | OUTPATIENT
Start: 2022-06-27 | End: 2022-06-28 | Stop reason: HOSPADM

## 2022-06-27 RX ORDER — ATORVASTATIN CALCIUM 10 MG/1
10 TABLET, FILM COATED ORAL DAILY
Status: DISCONTINUED | OUTPATIENT
Start: 2022-06-27 | End: 2022-06-28 | Stop reason: HOSPADM

## 2022-06-27 RX ORDER — SODIUM CHLORIDE 450 MG/100ML
50 INJECTION, SOLUTION INTRAVENOUS CONTINUOUS
Status: DISCONTINUED | OUTPATIENT
Start: 2022-06-27 | End: 2022-06-28 | Stop reason: HOSPADM

## 2022-06-27 RX ORDER — LIDOCAINE HYDROCHLORIDE 10 MG/ML
INJECTION, SOLUTION EPIDURAL; INFILTRATION; INTRACAUDAL; PERINEURAL AS NEEDED
Status: DISCONTINUED | OUTPATIENT
Start: 2022-06-27 | End: 2022-06-27 | Stop reason: SURG

## 2022-06-27 RX ORDER — ROPIVACAINE HYDROCHLORIDE 2 MG/ML
INJECTION, SOLUTION EPIDURAL; INFILTRATION; PERINEURAL CONTINUOUS
Status: DISCONTINUED | OUTPATIENT
Start: 2022-06-27 | End: 2022-06-28 | Stop reason: HOSPADM

## 2022-06-27 RX ORDER — SODIUM CHLORIDE, SODIUM LACTATE, POTASSIUM CHLORIDE, CALCIUM CHLORIDE 600; 310; 30; 20 MG/100ML; MG/100ML; MG/100ML; MG/100ML
9 INJECTION, SOLUTION INTRAVENOUS CONTINUOUS
Status: DISCONTINUED | OUTPATIENT
Start: 2022-06-27 | End: 2022-06-28 | Stop reason: HOSPADM

## 2022-06-27 RX ORDER — LIDOCAINE HYDROCHLORIDE 10 MG/ML
0.5 INJECTION, SOLUTION EPIDURAL; INFILTRATION; INTRACAUDAL; PERINEURAL ONCE AS NEEDED
Status: COMPLETED | OUTPATIENT
Start: 2022-06-27 | End: 2022-06-27

## 2022-06-27 RX ORDER — PANTOPRAZOLE SODIUM 40 MG/1
40 TABLET, DELAYED RELEASE ORAL
Status: DISCONTINUED | OUTPATIENT
Start: 2022-06-28 | End: 2022-06-28 | Stop reason: HOSPADM

## 2022-06-27 RX ORDER — OXYCODONE HYDROCHLORIDE 5 MG/1
5 TABLET ORAL EVERY 4 HOURS PRN
Status: DISCONTINUED | OUTPATIENT
Start: 2022-06-27 | End: 2022-06-28 | Stop reason: HOSPADM

## 2022-06-27 RX ORDER — ONDANSETRON 2 MG/ML
INJECTION INTRAMUSCULAR; INTRAVENOUS AS NEEDED
Status: DISCONTINUED | OUTPATIENT
Start: 2022-06-27 | End: 2022-06-27 | Stop reason: SURG

## 2022-06-27 RX ORDER — MIDAZOLAM HYDROCHLORIDE 1 MG/ML
0.5 INJECTION INTRAMUSCULAR; INTRAVENOUS
Status: DISCONTINUED | OUTPATIENT
Start: 2022-06-27 | End: 2022-06-27 | Stop reason: HOSPADM

## 2022-06-27 RX ORDER — MAGNESIUM HYDROXIDE 1200 MG/15ML
LIQUID ORAL AS NEEDED
Status: DISCONTINUED | OUTPATIENT
Start: 2022-06-27 | End: 2022-06-27 | Stop reason: HOSPADM

## 2022-06-27 RX ORDER — ACETAMINOPHEN 500 MG
1000 TABLET ORAL ONCE
Status: COMPLETED | OUTPATIENT
Start: 2022-06-27 | End: 2022-06-27

## 2022-06-27 RX ORDER — ACETAMINOPHEN 325 MG/1
650 TABLET ORAL EVERY 4 HOURS PRN
Status: DISCONTINUED | OUTPATIENT
Start: 2022-06-27 | End: 2022-06-28 | Stop reason: HOSPADM

## 2022-06-27 RX ORDER — CETIRIZINE HYDROCHLORIDE 10 MG/1
10 TABLET ORAL DAILY
Status: DISCONTINUED | OUTPATIENT
Start: 2022-06-27 | End: 2022-06-28 | Stop reason: HOSPADM

## 2022-06-27 RX ORDER — FAMOTIDINE 20 MG/1
20 TABLET, FILM COATED ORAL ONCE
Status: COMPLETED | OUTPATIENT
Start: 2022-06-27 | End: 2022-06-27

## 2022-06-27 RX ORDER — PROPOFOL 10 MG/ML
VIAL (ML) INTRAVENOUS AS NEEDED
Status: DISCONTINUED | OUTPATIENT
Start: 2022-06-27 | End: 2022-06-27 | Stop reason: SURG

## 2022-06-27 RX ORDER — FENTANYL CITRATE 50 UG/ML
INJECTION, SOLUTION INTRAMUSCULAR; INTRAVENOUS
Status: COMPLETED | OUTPATIENT
Start: 2022-06-27 | End: 2022-06-27

## 2022-06-27 RX ADMIN — VANCOMYCIN HYDROCHLORIDE 1500 MG: 500 INJECTION, POWDER, LYOPHILIZED, FOR SOLUTION INTRAVENOUS at 14:50

## 2022-06-27 RX ADMIN — FAMOTIDINE 20 MG: 20 TABLET ORAL at 12:49

## 2022-06-27 RX ADMIN — BUPIVACAINE HYDROCHLORIDE 10 ML: 2.5 INJECTION, SOLUTION EPIDURAL; INFILTRATION; INTRACAUDAL at 13:15

## 2022-06-27 RX ADMIN — CEFAZOLIN SODIUM 2 G: 2 INJECTION, SOLUTION INTRAVENOUS at 14:23

## 2022-06-27 RX ADMIN — SODIUM CHLORIDE, POTASSIUM CHLORIDE, SODIUM LACTATE AND CALCIUM CHLORIDE 9 ML/HR: 600; 310; 30; 20 INJECTION, SOLUTION INTRAVENOUS at 12:30

## 2022-06-27 RX ADMIN — TRANEXAMIC ACID 1000 MG: 10 INJECTION, SOLUTION INTRAVENOUS at 16:53

## 2022-06-27 RX ADMIN — ACETAMINOPHEN 1000 MG: 500 TABLET ORAL at 12:49

## 2022-06-27 RX ADMIN — DEXAMETHASONE SODIUM PHOSPHATE 8 MG: 4 INJECTION, SOLUTION INTRA-ARTICULAR; INTRALESIONAL; INTRAMUSCULAR; INTRAVENOUS; SOFT TISSUE at 15:10

## 2022-06-27 RX ADMIN — ATORVASTATIN CALCIUM 10 MG: 10 TABLET, FILM COATED ORAL at 21:15

## 2022-06-27 RX ADMIN — TRANEXAMIC ACID 1000 MG: 10 INJECTION, SOLUTION INTRAVENOUS at 15:10

## 2022-06-27 RX ADMIN — PROPOFOL 50 MG: 10 INJECTION, EMULSION INTRAVENOUS at 15:31

## 2022-06-27 RX ADMIN — FENTANYL CITRATE 100 MCG: 50 INJECTION, SOLUTION INTRAMUSCULAR; INTRAVENOUS at 13:15

## 2022-06-27 RX ADMIN — FENTANYL CITRATE 50 MCG: 50 INJECTION, SOLUTION INTRAMUSCULAR; INTRAVENOUS at 18:19

## 2022-06-27 RX ADMIN — ONDANSETRON 4 MG: 2 INJECTION INTRAMUSCULAR; INTRAVENOUS at 16:54

## 2022-06-27 RX ADMIN — CETIRIZINE HYDROCHLORIDE TABLETS 10 MG: 10 TABLET, FILM COATED ORAL at 21:15

## 2022-06-27 RX ADMIN — ESCITALOPRAM OXALATE 20 MG: 20 TABLET ORAL at 21:15

## 2022-06-27 RX ADMIN — QUETIAPINE FUMARATE 75 MG: 25 TABLET ORAL at 21:14

## 2022-06-27 RX ADMIN — LIDOCAINE HYDROCHLORIDE 50 MG: 10 INJECTION, SOLUTION EPIDURAL; INFILTRATION; INTRACAUDAL; PERINEURAL at 15:03

## 2022-06-27 RX ADMIN — HYDROMORPHONE HYDROCHLORIDE 0.5 MG: 1 INJECTION, SOLUTION INTRAMUSCULAR; INTRAVENOUS; SUBCUTANEOUS at 21:16

## 2022-06-27 RX ADMIN — LIDOCAINE HYDROCHLORIDE 0.2 ML: 10 INJECTION, SOLUTION EPIDURAL; INFILTRATION; INTRACAUDAL; PERINEURAL at 12:30

## 2022-06-27 RX ADMIN — SODIUM CHLORIDE 50 ML/HR: 4.5 INJECTION, SOLUTION INTRAVENOUS at 21:22

## 2022-06-27 RX ADMIN — FENTANYL CITRATE 50 MCG: 50 INJECTION, SOLUTION INTRAMUSCULAR; INTRAVENOUS at 18:04

## 2022-06-27 RX ADMIN — Medication 1000 MG: at 17:35

## 2022-06-27 RX ADMIN — FENTANYL CITRATE 50 MCG: 50 INJECTION, SOLUTION INTRAMUSCULAR; INTRAVENOUS at 17:56

## 2022-06-27 RX ADMIN — LOSARTAN POTASSIUM 25 MG: 25 TABLET, FILM COATED ORAL at 21:19

## 2022-06-27 RX ADMIN — PROPOFOL 50 MG: 10 INJECTION, EMULSION INTRAVENOUS at 16:22

## 2022-06-27 RX ADMIN — PROPOFOL 200 MG: 10 INJECTION, EMULSION INTRAVENOUS at 15:03

## 2022-06-27 RX ADMIN — ESMOLOL HYDROCHLORIDE 30 MG: 10 INJECTION, SOLUTION INTRAVENOUS at 15:29

## 2022-06-27 RX ADMIN — ROCURONIUM BROMIDE 5 MG: 10 INJECTION INTRAVENOUS at 16:44

## 2022-06-27 RX ADMIN — FENTANYL CITRATE 50 MCG: 50 INJECTION, SOLUTION INTRAMUSCULAR; INTRAVENOUS at 18:30

## 2022-06-27 RX ADMIN — SUGAMMADEX 200 MG: 100 INJECTION, SOLUTION INTRAVENOUS at 17:05

## 2022-06-27 RX ADMIN — ROCURONIUM BROMIDE 50 MG: 10 INJECTION INTRAVENOUS at 15:04

## 2022-06-27 NOTE — ANESTHESIA PROCEDURE NOTES
Left ISB Catheter      Patient reassessed immediately prior to procedure    Patient location during procedure: pre-op  Reason for block: at surgeon's request and post-op pain management  Performed by  CRNA/CAA: Lyn Zurita CRNA  Assisted by: Lizbeth Reese RN  Preanesthetic Checklist  Completed: patient identified, IV checked, site marked, risks and benefits discussed, surgical consent, monitors and equipment checked, pre-op evaluation and timeout performed  Prep:  Pt Position: right lateral decubitus  Sterile barriers:cap, gloves, mask and sterile barriers  Prep: ChloraPrep  Patient monitoring: blood pressure monitoring, continuous pulse oximetry and EKG  Procedure    Sedation: yes  Performed under: local infiltration  Guidance:ultrasound guided  Images:still images obtained, printed/placed on chart    Laterality:left  Block Type:interscalene  Injection Technique:catheter  Needle Type:Tuohy and echogenic  Needle Gauge:18 G  Resistance on Injection: none  Catheter Size:20 G (20g)  Cath Depth at skin: 8 cm    Medications Used: fentaNYL citrate (PF) (SUBLIMAZE) injection, 100 mcg  bupivacaine PF (MARCAINE) 0.25 % injection, 10 mL  Med administered at 6/27/2022 1:15 PM      Post Assessment  Injection Assessment: negative aspiration for heme, no paresthesia on injection and incremental injection  Patient Tolerance:comfortable throughout block  Complications:no  Additional Notes  Procedure:                 The pt was placed in semifowlers position with a slight tilt of the thorax contralateral to the insertion site.  The Insertion Site was prepped and draped in sterile fashion.  The pt was anesthetized with  IV Sedation( see meds) and  Skin and cutaneous tissue was infiltrated and anesthetized with 1% Lidocaine 3 mls via a 25g needle.  Utilizing ultrasound guidance, a BBraun 4 inch 18 g Contiplex echogenic touhy needle was advanced in-plane.  Hydro dissection of tissue was achieved with Normal saline. Major  vessels(carotid and Internal Jugular) where visualized as the brachial plexus was approached at the approximate level of C-7/ T-1.  Cervical 5 and Branches of Cervical 6 nerve roots where visualized and the needle tip was placed posterior at the level of C-6 roots.  LA spread was visualized and injection was made incrementally every 5 mls with aspiration. Injection pressure was normal or little, there was no intraneural injection, no vascular injection.      The BBraun 20 g wire stylet catheter was then placed under US guidance on the posterior aspect of the Brachial Plexus. The tuohy was removed and the location of catheter was confirmed with NS injection visualized with US . The skin was sealed with exofin tissue adhesive at catheter insertion site.  Skin was prepped with benzoin and the catheter was secured with steristrips and a CHG tegaderm. Appropriate labels applied. Thank You.

## 2022-06-27 NOTE — ANESTHESIA PREPROCEDURE EVALUATION
Anesthesia Evaluation     Patient summary reviewed and Nursing notes reviewed   no history of anesthetic complications:  NPO Solid Status: > 8 hours  NPO Liquid Status: > 2 hours           Airway   Mallampati: II  TM distance: >3 FB  Neck ROM: full  Large neck circumference and Possible difficult intubation  Dental    (+) upper dentures and lower dentures    Pulmonary    (+) a smoker Former, lung cancer, asthma,sleep apnea, decreased breath sounds,   Cardiovascular - normal exam    ECG reviewed  Patient on routine beta blocker    (+) hypertension,   (-) dysrhythmias, angina, CHF    ROS comment: Dr Ag - preop stress/echo wnl, acceptable CV risk    Neuro/Psych  (+) psychiatric history PTSD,    GI/Hepatic/Renal/Endo    (+) obesity,  GERD,      Musculoskeletal     Abdominal    Substance History - negative use     OB/GYN          Other        ROS/Med Hx Other: hgb 13.8 k 4.5  08/2021 - [facility] - [id number]                Anesthesia Plan    ASA 3     general with block     (Risks and benefits of general anesthesia discussed with patient (including MI, CVA, death, recall, aspiration, oropharyngeal/dental damage), questions answered, agreeable to proceed.    Benefits and risks of nerve block/catheter discussed with patient ((including failed block, damage to nerve/nearby structures, intravascular injection)); questions answered, agreeable to proceed.  )  intravenous induction     Anesthetic plan, risks, benefits, and alternatives have been provided, discussed and informed consent has been obtained with: patient.  Use of blood products discussed with patient  Consented to blood products.   Plan discussed with CRNA.        CODE STATUS:

## 2022-06-27 NOTE — OP NOTE
DATE OF OPERATION:  06/27/22    PREOPERATIVE DIAGNOSIS:   Left scapular spine/acromion scapular fracture    POSTOPERATIVE DIAGNOSES:  Left scapular spine/acromion scapular fracture    PROCEDURES PERFORMED:  1.  Open reduction internal fixation left scapula spine/acromion fracture    SURGEON: Carson Ashley MD  ASSISTANTS:  1.   Rene Davis DO, PGY 6 sports fellow  2.  Thiago Bañuelos MD, PGY 5    ANESTHESIA: General plus block.      ESTIMATED BLOOD LOSS: 50 mL.    COMPLICATIONS: None.      IMPLANT: Synthes locking olecranon plate    COMPLICATIONS: None.      DISPOSITION: Recovery room in stable condition.       INDICATIONS: This is a  66-year-old female who sustained a left displaced scapular spine fracture shortly after a left reverse shoulder replacement was performed.  Conservative treatment was trialed but her pain progressively worsened and she developed displacement of the fracture.  Risks, benefits, and alternatives were discussed after discussion the patient wished to proceed with surgery.    DESCRIPTION OF PROCEDURE: On the day of surgery, the patient identified the  left shoulder as the correct operative extremity. This was initialed by the surgeon with the patient's acknowledgment. The patient underwent placement of an interscalene nerve block and was taken to the operating room and placed in the supine position. Upon induction of adequate anesthesia, the patient was brought up to the beachchair position and the left shoulder and upper extremity were prepped and draped in usual sterile fashion.  Timeout confirmed the correct patient and operative extremity as well as that antibiotics were on board.     A curvilinear incision was made along the scapular spine centered over the fracture site and carried sharply through the skin and subcutaneous tissue.  Flaps were developed over the deltotrapezial fascia.  The fascia was then divided directly over the scapular spine and subperiosteal dissection used to  expose the fracture.    A hypertrophic nonunion was present which was taken down with a rongeur.  Lateral tilt of the acromion was easily noted.  After the fracture was opened a rongeur was used to take synovial samples from the joint and rotator cuff deep to this given a history of a positive culture and her work-up for painful shoulder arthroplasty.  No overt signs of infection were present.  A Synthes olecranon plate was fashioned and bent to wrap around the acromion and follow the curve of the scapular spine.  Once the appropriate bend was achieved it was applied to the acromion and scapular spine and C arm confirmed restoration of alignment of the fracture.  The plate was secured to the lateral portion of the acromion with 3 locking screws and then the plate was tilted along with the fracture to restore alignment and secured with 4 3.5mm screws and 1 locking screw.  C-arm confirmed appropriate alignment and length of the fracture.    The fracture was packed with 5 cc of Vivigen bone graft the wound was irrigated and the fascia was repaired with 0 Vicryl.  Subcutaneous tissue was closed with 2-0 Vicryl and the skin with Monocryl.  Sterile dressing was placed.  Anesthesia was reversed and the patient was taken to the recovery room in stable condition. All instrument, needle, and sponge counts were correct.

## 2022-06-27 NOTE — ANESTHESIA PROCEDURE NOTES
Airway  Urgency: elective    Date/Time: 6/27/2022 3:08 PM  Airway not difficult    General Information and Staff    Patient location during procedure: OR    Indications and Patient Condition  Indications for airway management: airway protection    Preoxygenated: yes  MILS not maintained throughout  Mask difficulty assessment: 2 - vent by mask + OA or adjuvant +/- NMBA    Final Airway Details  Final airway type: endotracheal airway      Successful airway: ETT  Cuffed: yes   Successful intubation technique: direct laryngoscopy  Endotracheal tube insertion site: oral  Blade: Land  Blade size: 2  ETT size (mm): 7.0  Cormack-Lehane Classification: grade I - full view of glottis  Placement verified by: chest auscultation and capnometry   Measured from: lips  ETT/EBT  to lips (cm): 21  Number of attempts at approach: 1  Assessment: lips, teeth, and gum same as pre-op and atraumatic intubation    Additional Comments  Negative epigastric sounds, Breath sound equal bilaterally with symmetric chest rise and fall

## 2022-06-27 NOTE — ANESTHESIA POSTPROCEDURE EVALUATION
Patient: Perla Adair    Procedure Summary       Date: 06/27/22 Room / [facility]:  [facility]  [facility]    Anesthesia Start: 1500 Anesthesia Stop:     Procedure: SCAPULA OPEN REDUCTION INTERNAL FIXATION WITH ALLOGRAFT BONE GRAFT- LEFT (Left Shoulder) Diagnosis:       Left scapula fracture      (Left scapula fracture [547295])    Surgeons: Carson Ashley MD Provider: Tressa Darnell DO    Anesthesia Type: general with block ASA Status: 3            Anesthesia Type: general with block    Vitals  Vitals Value Taken Time   /80 06/27/22 1845   Temp 97.5 °F (36.4 °C) 06/27/22 1845   Pulse 66 06/27/22 1845   Resp 14 06/27/22 1845   SpO2 97 % 06/27/22 1845           Post Anesthesia Care and Evaluation    Patient location during evaluation: PACU  Patient participation: complete - patient participated  Level of consciousness: awake and alert  Pain management: adequate    Airway patency: patent  Anesthetic complications: No anesthetic complications  PONV Status: none  Cardiovascular status: hemodynamically stable and acceptable  Respiratory status: nonlabored ventilation, acceptable and nasal cannula  Hydration status: acceptable

## 2022-06-27 NOTE — H&P
Pre-Op H&P  Perla Adair  [id number]  1955    Chief complaint: left shoulder pain    HPI:    Patient is a 66 y.o.female who presents with left shoulder pain in the presence of a previous left shoulder replacement. She is scheduled for an ORIF of the left scapula with allograft. She denies any significant cardiac history.     Review of Systems:  General ROS: negative for chills, fever or skin lesions.  Cardiovascular ROS: no chest pain or dyspnea on exertion  Respiratory ROS: no cough, shortness of breath, or wheezing    Allergies:   Allergies   Allergen Reactions   • Adhesive Tape Rash   • Chlorhexidine Gluconate Itching and Rash     Burns skin   • Amoxicillin-Pot Clavulanate Nausea And Vomiting   • Butorphanol Other (See Comments)     Chest pain   • Codeine Other (See Comments)     Chest pain     • Morphine Other (See Comments)     Chest pain       Home Meds:    No current facility-administered medications on file prior to encounter.     Current Outpatient Medications on File Prior to Encounter   Medication Sig Dispense Refill   • atorvastatin (LIPITOR) 10 MG tablet Take 10 mg by mouth Daily.     • escitalopram (LEXAPRO) 20 MG tablet 20 mg.     • fexofenadine (ALLEGRA) 180 MG tablet Allergy Relief (fexofenadine) 180 mg tablet     • irbesartan (AVAPRO) 75 MG tablet Take 75 mg by mouth Every Night.     • omeprazole (priLOSEC) 40 MG capsule Take 40 mg by mouth 2 (Two) Times a Day.     • QUEtiapine (SEROquel) 25 MG tablet Take 75 mg by mouth Every Night.     • venlafaxine 225 MG tablet sustained-release 24 hour 24 hr tablet 225 mg Daily With Breakfast.     • [DISCONTINUED] omeprazole (priLOSEC) 20 MG capsule Take 20 mg by mouth Daily.     • aspirin-acetaminophen-caffeine (Excedrin Extra Strength) 250-250-65 MG per tablet Excedrin Extra Strength     • frovatriptan (Frova) 2.5 MG tablet Take 1 tablet by mouth 1 (One) Time As Needed for Migraine for up to 30 doses. If recurs, may repeat after 2 hours. Max of 3  "tabs in 24 hours. 10 tablet 3   • [DISCONTINUED] Escitalopram Oxalate (LEXAPRO PO) Take  by mouth.     • [DISCONTINUED] gabapentin (NEURONTIN) 300 MG capsule Take 300 mg by mouth 3 (Three) Times a Day.     • [DISCONTINUED] METHOCARBAMOL PO Take  by mouth.     • [DISCONTINUED] PROPRANOLOL HCL ER PO Take  by mouth.         PMH:   Past Medical History:   Diagnosis Date   • Anxiety    • Arthritis    • Asthma    • CTS (carpal tunnel syndrome)    • Depression    • Dizziness    • Fractures    • GERD (gastroesophageal reflux disease)    • Headache, tension-type    • Hypertension    • Lung cancer (HCC) 2016   • Migraine    • PTSD (post-traumatic stress disorder)    • Shingles    • Sleep apnea     CPAP nightly   • Stomach problems     diverticulosis   • Syncope    • Urinary incontinence      PSH:    Past Surgical History:   Procedure Laterality Date   • ANKLE OPEN REDUCTION INTERNAL FIXATION Bilateral    • APPENDECTOMY     • CHOLECYSTECTOMY     • COLONOSCOPY     • FINGER SURGERY      Re attachment Left thumb   • HYSTERECTOMY      complete   • JOINT REPLACEMENT Left     knee   • LUMBAR FUSION      L3-S1   • LUNG LOBECTOMY      right upper portion   • SHOULDER SURGERY      RCR - 3 Right  1 left       Immunization History:  Influenza: UTD  Pneumococcal: UTD  Tetanus: unsure    Social History:   Tobacco:   Social History     Tobacco Use   Smoking Status Former Smoker   • Packs/day: 0.25   • Years: 20.00   • Pack years: 5.00   • Types: Cigarettes   • Start date:    • Quit date:    • Years since quittin.5   Smokeless Tobacco Never Used      Alcohol:     Social History     Substance and Sexual Activity   Alcohol Use Not Currently    Comment: socially       Vitals:           BP (!) 179/118 (BP Location: Right arm, Patient Position: Sitting)   Pulse 78   Temp 97.4 °F (36.3 °C) (Temporal)   Resp 18   Ht 157.5 cm (62\")   Wt 95.3 kg (210 lb)   SpO2 96%   Breastfeeding No   BMI 38.41 kg/m²     Physical " Exam:  General Appearance:    Alert, cooperative, no distress, appears stated age   Head:    Normocephalic, without obvious abnormality, atraumatic, on nasal cannula    Lungs:     Clear to auscultation bilaterally, respirations unlabored    Heart:   Regular rate and rhythm, no murmur, rub or gallop    Abdomen:    Soft, nontender. No rigidity or guarding.    Breast Exam:    deferred   Genitalia:    deferred   Extremities:   Incision on left shoulder well-healed, no cyanosis or edema   Skin:   Skin color, texture, turgor normal, no rashes or lesions   Neurologic:   Grossly intact   Results Review  LABS:  Lab Results   Component Value Date    WBC 10.37 06/17/2022    HGB 13.8 06/17/2022    HCT 41.0 06/17/2022    MCV 88.0 06/17/2022     06/17/2022    NEUTROABS 7.66 (H) 05/17/2022    GLUCOSE 84 06/17/2022    BUN 17 06/17/2022    CREATININE 0.73 06/17/2022    EGFRIFNONA >60 08/04/2021    EGFRIFAFRI >60 08/04/2021     06/17/2022    K 4.5 06/17/2022     06/17/2022    CO2 28.0 06/17/2022    CALCIUM 9.7 06/17/2022    ALBUMIN 4.0 08/04/2021    AST 16 08/04/2021    ALT 15 08/04/2021    BILITOT 0.4 08/04/2021    INR 0.9 08/04/2021       RADIOLOGY:     I reviewed the patient's new imaging results and agree with the interpretation.     Impression: history of shoulder arthroplasty, left scapula fracture     Plan: open reduction internal fixation of left scapula with allograft bone graft    Rishabh Dinero PA-C   6/27/2022   14:24 EDT

## 2022-06-28 ENCOUNTER — APPOINTMENT (OUTPATIENT)
Dept: PREADMISSION TESTING | Facility: HOSPITAL | Age: 67
End: 2022-06-28

## 2022-06-28 VITALS
HEART RATE: 78 BPM | RESPIRATION RATE: 16 BRPM | TEMPERATURE: 98.2 F | DIASTOLIC BLOOD PRESSURE: 79 MMHG | HEIGHT: 62 IN | OXYGEN SATURATION: 94 % | SYSTOLIC BLOOD PRESSURE: 168 MMHG | WEIGHT: 210 LBS | BODY MASS INDEX: 38.64 KG/M2

## 2022-06-28 PROBLEM — G43.909 MIGRAINES: Status: ACTIVE | Noted: 2022-06-28

## 2022-06-28 LAB
ANION GAP SERPL CALCULATED.3IONS-SCNC: 8 MMOL/L (ref 5–15)
BUN SERPL-MCNC: 14 MG/DL (ref 8–23)
BUN/CREAT SERPL: 22.2 (ref 7–25)
CALCIUM SPEC-SCNC: 9.3 MG/DL (ref 8.6–10.5)
CHLORIDE SERPL-SCNC: 98 MMOL/L (ref 98–107)
CO2 SERPL-SCNC: 28 MMOL/L (ref 22–29)
CREAT SERPL-MCNC: 0.63 MG/DL (ref 0.57–1)
DEPRECATED RDW RBC AUTO: 46.1 FL (ref 37–54)
EGFRCR SERPLBLD CKD-EPI 2021: 98.6 ML/MIN/1.73
ERYTHROCYTE [DISTWIDTH] IN BLOOD BY AUTOMATED COUNT: 14.2 % (ref 12.3–15.4)
GLUCOSE SERPL-MCNC: 111 MG/DL (ref 65–99)
HCT VFR BLD AUTO: 36.2 % (ref 34–46.6)
HGB BLD-MCNC: 12 G/DL (ref 12–15.9)
MCH RBC QN AUTO: 29.7 PG (ref 26.6–33)
MCHC RBC AUTO-ENTMCNC: 33.1 G/DL (ref 31.5–35.7)
MCV RBC AUTO: 89.6 FL (ref 79–97)
PLATELET # BLD AUTO: 274 10*3/MM3 (ref 140–450)
PMV BLD AUTO: 10.5 FL (ref 6–12)
POTASSIUM SERPL-SCNC: 4.1 MMOL/L (ref 3.5–5.2)
RBC # BLD AUTO: 4.04 10*6/MM3 (ref 3.77–5.28)
SODIUM SERPL-SCNC: 134 MMOL/L (ref 136–145)
WBC NRBC COR # BLD: 14.88 10*3/MM3 (ref 3.4–10.8)

## 2022-06-28 PROCEDURE — 97166 OT EVAL MOD COMPLEX 45 MIN: CPT

## 2022-06-28 PROCEDURE — 25010000002 ONDANSETRON PER 1 MG

## 2022-06-28 PROCEDURE — 63710000001

## 2022-06-28 PROCEDURE — 97535 SELF CARE MNGMENT TRAINING: CPT

## 2022-06-28 PROCEDURE — A9270 NON-COVERED ITEM OR SERVICE: HCPCS

## 2022-06-28 PROCEDURE — A9270 NON-COVERED ITEM OR SERVICE: HCPCS | Performed by: ORTHOPAEDIC SURGERY

## 2022-06-28 PROCEDURE — 97110 THERAPEUTIC EXERCISES: CPT

## 2022-06-28 PROCEDURE — 63710000001 ESCITALOPRAM 20 MG TABLET: Performed by: INTERNAL MEDICINE

## 2022-06-28 PROCEDURE — 63710000001 OXYCODONE 5 MG TABLET: Performed by: ORTHOPAEDIC SURGERY

## 2022-06-28 PROCEDURE — A9270 NON-COVERED ITEM OR SERVICE: HCPCS | Performed by: INTERNAL MEDICINE

## 2022-06-28 PROCEDURE — 80048 BASIC METABOLIC PNL TOTAL CA: CPT

## 2022-06-28 PROCEDURE — 85027 COMPLETE CBC AUTOMATED: CPT

## 2022-06-28 PROCEDURE — 63710000001 LOSARTAN 25 MG TABLET: Performed by: INTERNAL MEDICINE

## 2022-06-28 PROCEDURE — 63710000001 PANTOPRAZOLE 40 MG TABLET DELAYED-RELEASE: Performed by: INTERNAL MEDICINE

## 2022-06-28 PROCEDURE — 63710000001 CETIRIZINE 10 MG TABLET: Performed by: INTERNAL MEDICINE

## 2022-06-28 RX ORDER — DOCUSATE SODIUM 100 MG/1
100 CAPSULE, LIQUID FILLED ORAL 2 TIMES DAILY
Qty: 30 CAPSULE | Refills: 0 | Status: SHIPPED | OUTPATIENT
Start: 2022-06-28 | End: 2022-07-13

## 2022-06-28 RX ORDER — RIZATRIPTAN BENZOATE 10 MG/1
10 TABLET, ORALLY DISINTEGRATING ORAL 2 TIMES DAILY PRN
Status: DISCONTINUED | OUTPATIENT
Start: 2022-06-28 | End: 2022-06-28 | Stop reason: HOSPADM

## 2022-06-28 RX ORDER — ONDANSETRON 2 MG/ML
4 INJECTION INTRAMUSCULAR; INTRAVENOUS EVERY 6 HOURS PRN
Status: DISCONTINUED | OUTPATIENT
Start: 2022-06-28 | End: 2022-06-28 | Stop reason: HOSPADM

## 2022-06-28 RX ORDER — ROPIVACAINE HYDROCHLORIDE 2 MG/ML
1 INJECTION, SOLUTION EPIDURAL; INFILTRATION; PERINEURAL CONTINUOUS
Qty: 500 ML | Refills: 0 | Status: SHIPPED | OUTPATIENT
Start: 2022-06-28 | End: 2022-07-20

## 2022-06-28 RX ADMIN — OXYCODONE 5 MG: 5 TABLET ORAL at 08:22

## 2022-06-28 RX ADMIN — RIZATRIPTAN BENZOATE 10 MG: 10 TABLET, ORALLY DISINTEGRATING ORAL at 10:37

## 2022-06-28 RX ADMIN — ONDANSETRON 4 MG: 2 INJECTION INTRAMUSCULAR; INTRAVENOUS at 08:37

## 2022-06-28 RX ADMIN — PANTOPRAZOLE SODIUM 40 MG: 40 TABLET, DELAYED RELEASE ORAL at 08:22

## 2022-06-28 RX ADMIN — OXYCODONE 5 MG: 5 TABLET ORAL at 12:31

## 2022-06-28 RX ADMIN — CETIRIZINE HYDROCHLORIDE TABLETS 10 MG: 10 TABLET, FILM COATED ORAL at 08:22

## 2022-06-28 RX ADMIN — OXYCODONE 5 MG: 5 TABLET ORAL at 00:19

## 2022-06-28 RX ADMIN — LABETALOL HYDROCHLORIDE 10 MG: 5 INJECTION, SOLUTION INTRAVENOUS at 12:42

## 2022-06-28 RX ADMIN — RIZATRIPTAN BENZOATE 10 MG: 10 TABLET, ORALLY DISINTEGRATING ORAL at 09:14

## 2022-06-28 RX ADMIN — ESCITALOPRAM OXALATE 20 MG: 20 TABLET ORAL at 08:22

## 2022-06-28 RX ADMIN — LOSARTAN POTASSIUM 25 MG: 25 TABLET, FILM COATED ORAL at 08:22

## 2022-06-28 NOTE — PLAN OF CARE
Problem: Adult Inpatient Plan of Care  Goal: Plan of Care Review  Outcome: Met  Goal: Patient-Specific Goal (Individualized)  Outcome: Met  Goal: Absence of Hospital-Acquired Illness or Injury  Outcome: Met  Intervention: Identify and Manage Fall Risk  Recent Flowsheet Documentation  Taken 6/28/2022 1450 by Vika Garcia, RN  Safety Promotion/Fall Prevention:   activity supervised   assistive device/personal items within reach   clutter free environment maintained   fall prevention program maintained   gait belt   toileting scheduled   safety round/check completed   room organization consistent   nonskid shoes/slippers when out of bed  Taken 6/28/2022 1250 by Vika Garcia, RN  Safety Promotion/Fall Prevention:   activity supervised   assistive device/personal items within reach   clutter free environment maintained   fall prevention program maintained   gait belt   toileting scheduled   safety round/check completed   room organization consistent   nonskid shoes/slippers when out of bed  Taken 6/28/2022 1015 by Vika Garcia, RN  Safety Promotion/Fall Prevention:   activity supervised   assistive device/personal items within reach   clutter free environment maintained   fall prevention program maintained   gait belt   toileting scheduled   room organization consistent   safety round/check completed   nonskid shoes/slippers when out of bed  Taken 6/28/2022 0820 by Vika Garcia, RN  Safety Promotion/Fall Prevention:   activity supervised   assistive device/personal items within reach   clutter free environment maintained   fall prevention program maintained   gait belt   safety round/check completed   room organization consistent   toileting scheduled   nonskid shoes/slippers when out of bed  Intervention: Prevent Skin Injury  Recent Flowsheet Documentation  Taken 6/28/2022 1450 by Vika Garcia, RN  Body Position: (up in chair) other (see comments)  Taken 6/28/2022 1250 by Vika Garcia,  RN  Body Position: (up in chair) other (see comments)  Taken 6/28/2022 1015 by Vika Garcia RN  Body Position: sitting up in bed  Taken 6/28/2022 0820 by Vika Garcia RN  Body Position:   weight shifting   sitting up in bed  Intervention: Prevent and Manage VTE (Venous Thromboembolism) Risk  Recent Flowsheet Documentation  Taken 6/28/2022 1450 by Vika Garcia RN  Activity Management: ambulated to bathroom  VTE Prevention/Management:   bilateral   sequential compression devices off  Taken 6/28/2022 1250 by Vika Garcia RN  Activity Management:   up in chair   ambulated to bathroom  VTE Prevention/Management:   bilateral   sequential compression devices off  Taken 6/28/2022 1015 by Vika Garcia RN  VTE Prevention/Management:   bilateral   sequential compression devices on  Taken 6/28/2022 0820 by Vika Garcia RN  VTE Prevention/Management:   bilateral   sequential compression devices on  Intervention: Prevent Infection  Recent Flowsheet Documentation  Taken 6/28/2022 1450 by Vika Garcia RN  Infection Prevention: environmental surveillance performed  Taken 6/28/2022 1250 by Vika Garcia RN  Infection Prevention: environmental surveillance performed  Taken 6/28/2022 1015 by Vika Garcia RN  Infection Prevention: environmental surveillance performed  Taken 6/28/2022 0820 by Vika Garcia RN  Infection Prevention: environmental surveillance performed  Goal: Optimal Comfort and Wellbeing  Outcome: Met  Intervention: Provide Person-Centered Care  Recent Flowsheet Documentation  Taken 6/28/2022 1450 by Vika Garcia RN  Trust Relationship/Rapport: care explained  Taken 6/28/2022 1250 by Vika Garcia RN  Trust Relationship/Rapport: care explained  Taken 6/28/2022 1015 by Vika Garcia RN  Trust Relationship/Rapport: care explained  Taken 6/28/2022 0820 by Vika Garcia RN  Trust Relationship/Rapport:   care explained   choices provided    questions encouraged   questions answered   reassurance provided   thoughts/feelings acknowledged  Goal: Readiness for Transition of Care  Outcome: Met   Goal Outcome Evaluation:

## 2022-06-28 NOTE — CONSULTS
Patient Name: Perla Adair  MRN: [id number]  : 1955  DOS: 2022    Attending and Requesting Physician: Carson Ashley MD       Primary Care Provider: Noris Casillas PA      Reason for Consultation:  Post op medical management/ migraine    Subjective   Patient is a pleasant 66 y.o. female presented for scheduled surgery by .  She underwent open reduction internal fixation left scapula spine/acromion fracture under [facility] and a block, tolerated surgery well, was admitted for further management.     She is s/p left shoulder replacement at [facility] in February of this year. She reports pain continued after surgery and she was seen by Dr. Ashley. She was found to have left scapular spine/acromion scapular fracture which prompted her for surgery yesterday. She denies history of DVT or PE.    She does have a history of migraines for which she takes Rizatriptan at home.    When seen this morning, patient is complaining of migraine headache and associated nausea. She takes Rizatriptan, not on formulary here. Wife is bringing patient's supplied medication. She denies shoulder pain or cp. She does complain of mild SOB. Her O2 saturation is 94% on 2L O2. She does not appear to be in acute distress.         Allergies   Allergen Reactions   • Adhesive Tape Rash   • Chlorhexidine Gluconate Itching and Rash     Burns skin   • Amoxicillin-Pot Clavulanate Nausea And Vomiting   • Butorphanol Other (See Comments)     Chest pain   • Codeine Other (See Comments)     Chest pain     • Morphine Other (See Comments)     Chest pain          Medications Prior to Admission   Medication Sig Dispense Refill Last Dose   • atorvastatin (LIPITOR) 10 MG tablet Take 10 mg by mouth Daily.   2022 at Unknown time   • escitalopram (LEXAPRO) 20 MG tablet 20 mg.   2022 at Unknown time   • fexofenadine (ALLEGRA) 180 MG tablet Allergy Relief (fexofenadine) 180 mg tablet   2022 at Unknown time   • irbesartan  (AVAPRO) 75 MG tablet Take 75 mg by mouth Every Night.   6/26/2022 at Unknown time   • Mirabegron (MYRBETRIQ PO) Take 1 tablet by mouth Daily.   6/26/2022 at Unknown time   • omeprazole (priLOSEC) 40 MG capsule Take 40 mg by mouth 2 (Two) Times a Day.   6/26/2022 at Unknown time   • QUEtiapine (SEROquel) 25 MG tablet Take 75 mg by mouth Every Night.   6/26/2022 at Unknown time   • venlafaxine 225 MG tablet sustained-release 24 hour 24 hr tablet 225 mg Daily With Breakfast.   6/26/2022 at Unknown time   • aspirin-acetaminophen-caffeine (Excedrin Extra Strength) 250-250-65 MG per tablet Excedrin Extra Strength   More than a month at Unknown time   • frovatriptan (Frova) 2.5 MG tablet Take 1 tablet by mouth 1 (One) Time As Needed for Migraine for up to 30 doses. If recurs, may repeat after 2 hours. Max of 3 tabs in 24 hours. 10 tablet 3          History:   Past Medical History:   Diagnosis Date   • Anxiety    • Arthritis    • Asthma    • CTS (carpal tunnel syndrome)    • Depression    • Dizziness    • Fractures    • GERD (gastroesophageal reflux disease)    • Headache, tension-type    • Hypertension    • Lung cancer (HCC) 05/2016   • Migraine    • PTSD (post-traumatic stress disorder)    • Shingles    • Sleep apnea     CPAP nightly   • Stomach problems     diverticulosis   • Syncope    • Urinary incontinence      Past Surgical History:   Procedure Laterality Date   • ANKLE OPEN REDUCTION INTERNAL FIXATION Bilateral    • APPENDECTOMY     • CHOLECYSTECTOMY     • COLONOSCOPY     • FINGER SURGERY      Re attachment Left thumb   • HYSTERECTOMY      complete   • JOINT REPLACEMENT Left     knee   • LUMBAR FUSION      L3-S1   • LUNG LOBECTOMY      right upper portion   • SCAPULA OPEN REDUCTION INTERNAL FIXATION Left 6/27/2022    Procedure: SCAPULA OPEN REDUCTION INTERNAL FIXATION WITH ALLOGRAFT BONE GRAFT- LEFT;  Surgeon: Carson Ashley MD;  Location: [facility];  Service: Orthopedics;  Laterality: Left;   • SHOULDER  "SURGERY      RCR - 3 Right  1 left     Family History   Problem Relation Age of Onset   • Cancer Mother    • Diabetes Mother    • Hypertension Mother    • Heart attack Mother    • Colon cancer Mother    • Heart disease Mother    • Neuropathy Mother    • Obesity Mother    • Alcohol abuse Sister    • Colon cancer Sister    • Dementia Sister    • Heart disease Sister    • Migraine headaches Sister    • Obesity Sister    • Osteosclerosis Sister    • Alcohol abuse Brother    • Alzheimer's disease Brother    • Stomach cancer Brother    • Dementia Brother    • Heart disease Brother    • Mental illness Brother    • Neuropathy Brother    • Obesity Brother    • Suicidality Brother      Social History     Tobacco Use   • Smoking status: Former Smoker     Packs/day: 0.25     Years: 20.00     Pack years: 5.00     Types: Cigarettes     Start date:      Quit date:      Years since quittin.5   • Smokeless tobacco: Never Used   Vaping Use   • Vaping Use: Never used   Substance Use Topics   • Alcohol use: Not Currently     Comment: socially   • Drug use: Never   . Retired from [employer].    Review of Systems  Pertinent items are noted in HPI, all other systems reviewed and negative    Vital Signs  /95   Pulse 74   Temp 97.7 °F (36.5 °C) (Oral)   Resp 17   Ht 157.5 cm (62\")   Wt 95.3 kg (210 lb)   SpO2 92%   Breastfeeding No   BMI 38.41 kg/m²     Physical Exam:    General Appearance:    Alert, cooperative, in no acute distress   Head:    Normocephalic, without obvious abnormality, atraumatic   Eyes:            Lids and lashes normal, conjunctivae and sclerae normal, no   icterus, no pallor, corneas clear,    Ears:    Ears appear intact with no abnormalities noted   Throat:   No oral lesions, no thrush, oral mucosa moist   Neck:   No adenopathy, supple, trachea midline, no thyromegaly         Lungs:     Clear to auscultation,respirations regular, even and                   unlabored    Heart:    Regular " rhythm and normal rate, normal S1 and S2, no      murmur    Abdomen:     Normal bowel sounds, no masses, no organomegaly, soft        non-tender, non-distended, no guarding, no rebound                 tenderness   Genitalia:    Deferred   Extremities:   RUE in a sling, CDI surgical dressing shoulder. Interscalene nerve block cath present.  Distal pulses, cap refill, movements of fingers, wrist, intact.     Pulses:   Pulses palpable and equal bilaterally   Skin:   No bleeding, bruising or rash   Neurologic:   Cranial nerves 2 - 12 grossly intact      I reviewed the patient's new clinical results.             Invalid input(s): NEUTOPHILPCT,  EOSPCT        Invalid input(s): LABALBU, PROT  Lab Results   Component Value Date    HGBA1C 5.10 06/17/2022      Latest Reference Range & Units 06/28/22 10:25   WBC 3.40 - 10.80 10*3/mm3 14.88 (H)   RBC 3.77 - 5.28 10*6/mm3 4.04   Hemoglobin 12.0 - 15.9 g/dL 12.0   Hematocrit 34.0 - 46.6 % 36.2   RDW 12.3 - 15.4 % 14.2   MCV 79.0 - 97.0 fL 89.6   MCH 26.6 - 33.0 pg 29.7   MCHC 31.5 - 35.7 g/dL 33.1   MPV 6.0 - 12.0 fL 10.5   Platelets 140 - 450 10*3/mm3 274   RDW-SD 37.0 - 54.0 fl 46.1   (H): Data is abnormally high      Assessment and Plan:       S/P ORIF (open reduction internal fixation) fracture(  left scapula spine/acromion fracture)    Left shoulder pain    HTN (hypertension)    Hyperlipidemia    Obesity (BMI 30-39.9)    ROSSY (obstructive sleep apnea)    GERD (gastroesophageal reflux disease)    Migraines      Recommendations:    Migraine headache  -Resume patient supplied medication      HTN, Hyperlipidemia  - Continue home ARB, statin  - Monitor BP   - Holding parameters for BP meds  - Labetalol PRN for SBP>170     ROSSY  -CPAP  -monitor O2 sats     GERD  -protonix    Post op management to include this:      1. PT/OT. NWB, LUE, ROM hand, wrist, elbow.  2. Pain control-prns, interscalene nerve block cath with ropivacaine infusion.   3. IS-encourage  4. DVT proph- [facility]/  mobilize.  5. Bowel regimen  6. Resume home medications as appropriate  7. Monitor post-op labs  8. DC planning home    Dragon disclaimer:  Part of this encounter note is an electronic transcription/translation of spoken language to printed text. The electronic translation of spoken language may permit erroneous, or at times, nonsensical words or phrases to be inadvertently transcribed; Although I have reviewed the note for such errors, some may still exist.    JASON Flores  06/28/22  10:53 EDT    I have personally performed the evaluation on this patient. My history is consistent  with HPI obtained.   My exam findings are listed above. I have personally reviewed and discussed the above formulated treatment plan with pt, her wife, and JASON [email or web address].

## 2022-06-28 NOTE — CASE MANAGEMENT/SOCIAL WORK
Continued Stay Note  [facility]     Patient Name: Perla Adair  MRN: [id number]  Today's Date: 6/28/2022    Admit Date: 6/27/2022     Discharge Plan     Row Name 06/28/22 1251       Plan    Plan Home with wife's assistance    Patient/Family in Agreement with Plan yes    Plan Comments Met with Ms. Adair and her wife, Gini, ph [phone], at the bedside for discharge planning.    Ms. Adair lives with Gini in [facility].  She has 6 total steps to enter her home, with hand railings.  Ms. Adair is still pending OT evaluation.  She has a migraine this morning and will be seen a little later today by OT.   Gini states that she can assist Ms. Adair at home as needed.  The patient has a CPAP, st cane, rolling walker and shower chair at home.  She denines any additional DME needs.  She does not have any current home health or outpatient services at this time.  She has been to [facility] in the past after a knee surgery.    PCP is Noris Casillas.  Insurance is Medicare and [employer] with no interruption in coverage.    Ms. Adair will be transported home by her wife when discharged.    CM will continue to follow.    Final Discharge Disposition Code 01 - home or self-care                            Expected Discharge Date and Time     Expected Discharge Date Expected Discharge Time    Jun 27, 2022             Maliha Stark RN

## 2022-06-28 NOTE — PROGRESS NOTES
[facility]    Acute pain service Inpatient Progress Note    Patient Name: Perla Adair  :  1955  MRN:  [id number]        Acute Pain  Service Inpatient Progress Note:    Analgesia:Good  Pain Score:1/10 (Top of shoulder 1/10. Migraine headache 8/10)  LOC: alert and awake  Resp Status: room air  Cardiac: VS stable  Side Effects:None  Catheter Site:clean, dressing intact and dry  Catheter Plan:Catheter to remain Insitu, Continue catheter infusion rate unchanged and Patient to be discharged home  Comments: When pt was assessed this morning, she was having migraine headache c/o 8/10. Pain to top of shoulder was 1/10.

## 2022-06-28 NOTE — PLAN OF CARE
Goal Outcome Evaluation:  Plan of Care Reviewed With: patient        Progress: improving  Outcome Evaluation: OT aaliyah complete. Pt is pleasent and cooperative. Pt and wife educated on sling management as well as wear and care, shoulder precautions, axilla care, angie dressing and care of nerve catheter during ADLS. Wife with excellent teach back on all sling management and provided modA for pt to don pullover shirt via angie dressing. Pt completed AROM x10 reps elbow/wrist/hand/scapula. Wife provided maxA for all axilla care and to don pants. Pt ambulated approximately 300 feet with CGA and no noted LOB. Pt with decreased O2 saturations to 83%. Improves with PLB as well as rest breaks. Recommend d/c home with assist from wife when medically ready.

## 2022-06-28 NOTE — PROGRESS NOTES
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 LUE pain well controlled. Interested in going home later today.    Physical Exam:  I have reviewed the vital signs.  Temp:  [97 °F (36.1 °C)-98.5 °F (36.9 °C)] 97.7 °F (36.5 °C)  Heart Rate:  [66-80] 80  Resp:  [10-18] 17  BP: ()/() 174/101    Objective  General Appearance:    Alert, cooperative, no distress  Extremities: No clubbing, cyanosis, or edema to lower extremities  Pulses:  2+ in distal surgical extremity  Skin: Dressing Clean/dry/intact    Results Review:    I have reviewed the labs, radiology results and diagnostic studies: 3V of left shoulder personally interpreted demonstrating appropriate hardware placement with maintenance of reduction.      I have reviewed the medications.    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 Bowel regimen  Mobilization and teaching with PT/OT  Appreciate medical co-management  Dispo: Anticipate discharge home today  Follow-up 1 week with MD Gabi for wound check.     Discharge Planning: I expect patient to be discharged to home in 0 days.    Thiago Bañuelos MD  06/28/22  07:52 EDT

## 2022-06-28 NOTE — DISCHARGE SUMMARY
Patient Name: Perla Adair  MRN: [id number]  : 1955  DOS: 2022    Attending: Carson Ashley MD    Primary Care Provider: Noris Casillas PA    Date of Admission:.2022 11:00 AM    Date of Discharge:  2022    Discharge Diagnosis:   S/P ORIF (open reduction internal fixation) fracture(  left scapula spine/acromion fracture)    Left shoulder pain    HTN (hypertension)    Hyperlipidemia    Obesity (BMI 30-39.9)    ROSSY (obstructive sleep apnea)    GERD (gastroesophageal reflux disease)    Migraines      Hospital Course    At admit:  Patient is a pleasant 66 y.o. female presented for scheduled surgery by .  She underwent open reduction internal fixation left scapula spine/acromion fracture under GA and a block, tolerated surgery well, was admitted for further management. She is s/p left shoulder replacement at [facility] in February of this year. She reports pain continued after surgery and she was seen by Dr. Ashley. She was found to have left scapular spine/acromion scapular fracture which prompted her for surgery yesterday. She denies history of DVT or PE.     She does have a history of migraines for which she takes Rizatriptan at home.     When seen this morning, patient is complaining of migraine headache and associated nausea. She takes Rizatriptan, not on formulary here. Wife is bringing patient's supplied medication. She denies shoulder pain or cp. She does complain of mild SOB. Her O2 saturation is 94% on 2L O2. She does not appear to be in acute distress.     After admit:  Patient was provided pain medications as needed for pain control, along with interscalene nerve block infusion of Ropivacaine    Adjustments were made to pain medications to optimize postop pain management.   Risks and benefits of opiate medications discussed with patient. MAEVE report on chart was reviewed.    The patient was seen by OT and was taught exercises for left arm.  The patient used an  IS for atelectasis prophylaxis and mechanicals for DVT prophylaxis.    Home medications were resumed as appropriate, and labs were monitored and remained fairly stable.     Migraine medication given x2 and slightly improved but migraine still present.  Patient wishes to return home stating she would rest more comfortably at home.    With the progress she has made, she is ready for DC home today.      The patient will have an infusystem ( instructed on it during this admit)  Discussed with patient regarding plan and she shows understanding and agreement.        Procedures Performed  Procedure(s):  SCAPULA OPEN REDUCTION INTERNAL FIXATION WITH ALLOGRAFT BONE GRAFT- LEFT       Pertinent Test Results:    I reviewed the patient's new clinical results.   Results from last 7 days   Lab Units 06/28/22  1025   WBC 10*3/mm3 14.88*   HEMOGLOBIN g/dL 12.0   HEMATOCRIT % 36.2   PLATELETS 10*3/mm3 274     Results from last 7 days   Lab Units 06/28/22  1025   SODIUM mmol/L 134*   POTASSIUM mmol/L 4.1   CHLORIDE mmol/L 98   CO2 mmol/L 28.0   BUN mg/dL 14   CREATININE mg/dL 0.63   CALCIUM mg/dL 9.3   GLUCOSE mg/dL 111*     I reviewed the patient's new imaging including images and reports.      Occupational Therapy  Progress: improving  Outcome Evaluation: OT eval complete. Pt is pleasent and cooperative. Pt and wife educated on sling management as well as wear and care, shoulder precautions, axilla care, angie dressing and care of nerve catheter during ADLS. Wife with excellent teach back on all sling management and provided modA for pt to don pullover shirt via angie dressing. Pt completed AROM x10 reps elbow/wrist/hand/scapula. Wife provided maxA for all axilla care and to don pants. Pt ambulated approximately 300 feet with CGA and no noted LOB. Pt with decreased O2 saturations to 83%. Improves with PLB as well as rest breaks. Recommend d/c home with assist from wife when medically ready.    Patient was subsequently walked by  "nursing staff in her room and was able to maintain O2 saturations on room air, safe for discharge.    Discharge Assessment:       Visit Vitals  /79   Pulse 78   Temp 98.2 °F (36.8 °C) (Oral)   Resp 16   Ht 157.5 cm (62\")   Wt 95.3 kg (210 lb)   SpO2 94%   Breastfeeding No   BMI 38.41 kg/m²     Temp (24hrs), Av.8 °F (36.6 °C), Min:97 °F (36.1 °C), Max:98.5 °F (36.9 °C)      General Appearance:    Alert, cooperative, in no acute distress   Lungs:     Clear to auscultation,respirations regular, even and   unlabored    Heart:    Regular rhythm and normal rate, normal S1 and S2    Abdomen:     Normal bowel sounds, no masses, no organomegaly, soft        non-tender, non-distended, no guarding, no rebound                 tenderness   Extremities:   LUE in a sling, CDI surgical dressing over left shoulder incision . Interscalene nerve block cath present.  Distal pulses, cap refill,  intact. Movement and sensation intact     Pulses:   Pulses palpable and equal bilaterally   Skin:   No bleeding, bruising or rash        Discharge Disposition: Home          Discharge Medications      New Medications      Instructions Start Date   docusate sodium 100 MG capsule  Commonly known as: COLACE   100 mg, Oral, 2 Times Daily      oxyCODONE 5 MG immediate release tablet  Commonly known as: ROXICODONE   5 mg, Oral, Every 4 Hours PRN      ropivacaine 0.2 % infusion (INFUSYSTEM)  Commonly known as: NAROPIN   1 mL/hr (2 mg/hr), Peripheral Nerve, Continuous         Continue These Medications      Instructions Start Date   atorvastatin 10 MG tablet  Commonly known as: LIPITOR   10 mg, Oral, Daily      escitalopram 20 MG tablet  Commonly known as: LEXAPRO   20 mg      Excedrin Extra Strength 250-250-65 MG per tablet  Generic drug: aspirin-acetaminophen-caffeine   Excedrin Extra Strength      fexofenadine 180 MG tablet  Commonly known as: ALLEGRA   Allergy Relief (fexofenadine) 180 mg tablet      frovatriptan 2.5 MG tablet  Commonly " known as: Frova   2.5 mg, Oral, Once As Needed, If recurs, may repeat after 2 hours. Max of 3 tabs in 24 hours.      irbesartan 75 MG tablet  Commonly known as: AVAPRO   75 mg, Oral, Nightly      MYRBETRIQ PO   1 tablet, Oral, Daily      omeprazole 40 MG capsule  Commonly known as: priLOSEC   40 mg, Oral, 2 Times Daily      QUEtiapine 25 MG tablet  Commonly known as: SEROquel   75 mg, Oral, Nightly      venlafaxine 225 MG tablet sustained-release 24 hour 24 hr tablet   225 mg, Daily With Breakfast             Discharge Diet:   Diet Instructions    REGULAR DIET           Activity at Discharge:   Activity Instructions    SLING TO AFFECTED SHOULDER AT ALL TIMES EXCEPT HYGIENE AND REMOVE STRAPS WHEN SEATED    Take it easy until your nerve block comes out!    Monitor, spot check O2 at home         NWB LUE, ROM elbow, wrist, and hand per Dr. Ashley instructions.    Follow-up Appointments  Follow-up with Dr. Ashley in 1 week         JASON Flores  06/28/22  15:12 EDT      Discharge took less than 30 minutes

## 2022-06-28 NOTE — THERAPY EVALUATION
Patient Name: Perla Adair  : 1955    MRN: [id number]                              Today's Date: 2022       Admit Date: 2022    Visit Dx:     ICD-10-CM ICD-9-CM   1. Left shoulder pain  M25.512 719.41     Patient Active Problem List   Diagnosis   • Left shoulder pain   • HTN (hypertension)   • Hyperlipidemia   • Obesity (BMI 30-39.9)   • ROSSY (obstructive sleep apnea)   • GERD (gastroesophageal reflux disease)   • S/P ORIF (open reduction internal fixation) fracture(  left scapula spine/acromion fracture)   • Migraines     Past Medical History:   Diagnosis Date   • Anxiety    • Arthritis    • Asthma    • CTS (carpal tunnel syndrome)    • Depression    • Dizziness    • Fractures    • GERD (gastroesophageal reflux disease)    • Headache, tension-type    • Hypertension    • Lung cancer (HCC) 2016   • Migraine    • PTSD (post-traumatic stress disorder)    • Shingles    • Sleep apnea     CPAP nightly   • Stomach problems     diverticulosis   • Syncope    • Urinary incontinence      Past Surgical History:   Procedure Laterality Date   • ANKLE OPEN REDUCTION INTERNAL FIXATION Bilateral    • APPENDECTOMY     • CHOLECYSTECTOMY     • COLONOSCOPY     • FINGER SURGERY      Re attachment Left thumb   • HYSTERECTOMY      complete   • JOINT REPLACEMENT Left     knee   • LUMBAR FUSION      L3-S1   • LUNG LOBECTOMY      right upper portion   • SCAPULA OPEN REDUCTION INTERNAL FIXATION Left 2022    Procedure: SCAPULA OPEN REDUCTION INTERNAL FIXATION WITH ALLOGRAFT BONE GRAFT- LEFT;  Surgeon: Carson Ashley MD;  Location: [facility];  Service: Orthopedics;  Laterality: Left;   • SHOULDER SURGERY      RCR - 3 Right  1 left      General Information     Row Name 22 1509          OT Time and Intention    Document Type evaluation  -HK     Mode of Treatment occupational therapy  -HK     Row Name 22 1509          General Information    Patient Profile Reviewed yes  -HK     Prior Level of  Function independent:;all household mobility;gait;transfer;bed mobility;min assist:;ADL's  -HK     Existing Precautions/Restrictions fall;non-weight bearing;left;shoulder;other (see comments)  NWB L UE;  L UE in sling; L interscalene nerve catheter  -     Barriers to Rehab none identified  -HK     Row Name 06/28/22 1509          Living Environment    People in Home spouse  -HK     Row Name 06/28/22 1509          Home Main Entrance    Number of Stairs, Main Entrance three  -HK     Stair Railings, Main Entrance none  -HK     Row Name 06/28/22 1509          Stairs Within Home, Primary    Number of Stairs, Within Home, Primary three  -HK     Stair Railings, Within Home, Primary none  -HK     Row Name 06/28/22 1509          Cognition    Orientation Status (Cognition) oriented x 4  -HK     Row Name 06/28/22 1509          Safety Issues, Functional Mobility    Safety Issues Affecting Function (Mobility) safety precautions follow-through/compliance;safety precaution awareness  -     Impairments Affecting Function (Mobility) balance;shortness of breath;endurance/activity tolerance;pain;strength;range of motion (ROM)  -           User Key  (r) = Recorded By, (t) = Taken By, (c) = Cosigned By    Initials Name Provider Type    HK Shannon Castro, OT Occupational Therapist                 Mobility/ADL's     Row Name 06/28/22 1513          Bed Mobility    Comment, (Bed Mobility) Pt received and left [facility]. Pt and wife educated on compeltion of bed mobility while maintaining shoulder precautions.  -     Row Name 06/28/22 1513          Transfers    Transfers sit-stand transfer  -     Sit-Stand [place] (Transfers) contact guard;verbal cues  -     Row Name 06/28/22 1513          Sit-Stand Transfer    Assistive Device (Sit-Stand Transfers) other (see comments)  none  -     Row Name 06/28/22 1513          Functional Mobility    Functional Mobility- Ind. Level contact guard assist;verbal cues required  -     Functional  Mobility- Device other (see comments)  none  -HK     Functional Mobility-Distance (Feet) 300  -HK     Functional Mobility- Comment Pt ambulated approximately 300 feet with CGA and no noted LOB. O2 saturations decreased to 83% on RA. Improved to 94 with rest break and cues for PLB. Per wife pt has baseline dyspnea. Pt passed mobility screen with score of 22 and OT to d/c PT order.  -     Row Name 06/28/22 1513          Activities of Daily Living    BADL Assessment/Intervention lower body dressing;upper body dressing;bathing  -     Row Name 06/28/22 1513          Mobility    Extremity Weight-bearing Status left upper extremity  -HK     Left Upper Extremity (Weight-bearing Status) non weight-bearing (NWB)   -     Row Name 06/28/22 1513          Lower Body Dressing Assessment/Training    [place] Level (Lower Body Dressing) don;pants/bottoms;maximum assist (25% patient effort)  -HK     Position (Lower Body Dressing) unsupported sitting;unsupported standing  -     Row Name 06/28/22 1513          Upper Body Dressing Assessment/Training    [place] Level (Upper Body Dressing) doff;don;other (see comments);maximum assist (25% patient effort);pull-over garment;moderate assist (50% patient effort)  sling  -HK     Position (Upper Body Dressing) unsupported sitting  -HK     Comment, (Upper Body Dressing) Pt and wife educated on sling management as well as wear and care, shoulder precautions, axilla care, angie dressing and care of nerve catheter during ADLS. Wife with good teach back on all sling management and provided modA for pt to don pullover shirt via angie dressing technique.  -     Row Name 06/28/22 1513          Bathing Assessment/Intervention    [place] Level (Bathing) maximum assist (25% patient effort);other (see comments)  axilla care  -HK     Position (Bathing) unsupported sitting  -HK     Comment, (Bathing) Pt and wife educated on completion of L UE axilla care while maintaining shoulder  precautions. Pt maxA for completion.  -           User Key  (r) = Recorded By, (t) = Taken By, (c) = Cosigned By    Initials Name Provider Type     Shannon Castro, OT Occupational Therapist               Obj/Interventions     [facility] Name 06/28/22 1527          Sensory Assessment (Somatosensory)    Sensory Assessment (Somatosensory) sensation intact  -[facility] Name 06/28/22 1527          Vision Assessment/Intervention    Visual Impairment/Limitations WFL  -HK     Row Name 06/28/22 1527          Range of Motion Comprehensive    General Range of Motion no range of motion deficits identified  -     Comment, General Range of Motion L UE in sling; R UE  WFL for eval  -[facility] Name 06/28/22 1527          Strength Comprehensive (MMT)    Comment, General Manual Muscle Testing (MMT) Assessment L UE in sling; R UE  WFL for eval  -[facility] Name 06/28/22 1527          Shoulder (Therapeutic Exercise)    Shoulder (Therapeutic Exercise) AROM (active range of motion)  -     Shoulder AROM (Therapeutic Exercise) bilateral;scapular retraction;sitting  -HK     Row Name 06/28/22 1527          Elbow/Forearm (Therapeutic Exercise)    Elbow/Forearm (Therapeutic Exercise) AROM (active range of motion)  -     Elbow/Forearm AROM (Therapeutic Exercise) left;flexion;extension;supination;pronation;10 repetitions  -[facility] Name 06/28/22 1527          Wrist (Therapeutic Exercise)    Wrist (Therapeutic Exercise) AROM (active range of motion)  -     Wrist AROM (Therapeutic Exercise) left;flexion;extension;10 repetitions  -HK     Row Name 06/28/22 1527          Hand (Therapeutic Exercise)    Hand (Therapeutic Exercise) AROM (active range of motion)  -     Hand AROM/AAROM (Therapeutic Exercise) left;finger flexion;finger extension  -HK     Row Name 06/28/22 1527          Motor Skills    Therapeutic Exercise elbow/forearm;wrist;hand;shoulder  -HK     Row Name 06/28/22 1527          Balance    Balance Assessment sitting static  balance;sitting dynamic balance;standing static balance;standing dynamic balance  -HK     Static Sitting Balance independent  -HK     Dynamic Sitting Balance supervision  -HK     Position, Sitting Balance unsupported;sitting in chair  -HK     Static Standing Balance contact guard  -HK     Dynamic Standing Balance contact guard  -HK     Position/Device Used, Standing Balance unsupported  -HK     Balance Interventions sitting;occupation based/functional task  -HK           User Key  (r) = Recorded By, (t) = Taken By, (c) = Cosigned By    Initials Name Provider Type     Shannon Castro, OT Occupational Therapist               Goals/Plan     Row Name 06/28/22 1549          Transfer Goal 1 (OT)    Activity/Assistive Device (Transfer Goal 1, OT) sit-to-stand/stand-to-sit;toilet  -HK     [place] Level/Cues Needed (Transfer Goal 1, OT) contact guard required;verbal cues required  -HK     Time Frame (Transfer Goal 1, OT) by discharge;long term goal (LTG)  -HK     Progress/Outcome (Transfer Goal 1, OT) goal ongoing  -[facility] Name 06/28/22 5566          Dressing Goal 1 (OT)    Activity/Device (Dressing Goal 1, OT) upper body dressing;other (see comments)  Wife will be able to dof/don sling with supervision  -HK     [place]/Cues Needed (Dressing Goal 1, OT) supervision required;verbal cues required  -HK     Time Frame (Dressing Goal 1, OT) by discharge;long term goal (LTG)  -HK     Progress/Outcome (Dressing Goal 1, OT) goal ongoing  -     Row Name 06/28/22 7705          ROM Goal 1 (OT)    ROM Goal 1 (OT) Pt and wife will be able to adequately demonstrate L UE HEP per surgeons preference  -HK     Time Frame (ROM Goal 1, OT) by discharge;long term goal (LTG)  -HK     Progress/Outcome (ROM Goal 1, OT) goal ongoing  -     Row Name 06/28/22 8802          Therapy Assessment/Plan (OT)    Planned Therapy Interventions (OT) adaptive equipment training;BADL retraining;functional balance retraining;occupation/activity  based interventions;ROM/therapeutic exercise;transfer/mobility retraining  -HK           User Key  (r) = Recorded By, (t) = Taken By, (c) = Cosigned By    Initials Name Provider Type    HK Shannon Castro, OT Occupational Therapist               Clinical Impression     Row Name 06/28/22 1532          Pain Assessment    Pretreatment Pain Rating 5/10  -HK     Posttreatment Pain Rating 5/10  -HK     Pain Location - Side/Orientation Left  -HK     Pain Location generalized  -HK     Pain Location - shoulder  -HK     Pain Intervention(s) Ambulation/increased activity;Repositioned  -HK     Row Name 06/28/22 1532          Plan of Care Review    Plan of Care Reviewed With patient  -HK     Progress improving  -HK     Outcome Evaluation OT eval complete. Pt is pleasent and cooperative. Pt and wife educated on sling management as well as wear and care, shoulder precautions, axilla care, angie dressing and care of nerve catheter during ADLS. Wife with excellent teach back on all sling management and provided modA for pt to don pullover shirt via angie dressing. Pt completed AROM x10 reps elbow/wrist/hand/scapula. Wife provided maxA for all axilla care and to don pants. Pt ambulated approximately 300 feet with CGA and no noted LOB. Pt with decreased O2 saturations to 83%. Improves with PLB as well as rest breaks. Recommend d/c home with assist from wife when medically ready.  -     Row Name 06/28/22 1532          Therapy Assessment/Plan (OT)    Patient/Family Therapy Goal Statement (OT) Pt would like to improve and return home.  -HK     Rehab Potential (OT) good, to achieve stated therapy goals  -     Criteria for Skilled Therapeutic Interventions Met (OT) yes;skilled treatment is necessary  -     Therapy Frequency (OT) daily  -     Row Name 06/28/22 1532          Therapy Plan Review/Discharge Plan (OT)    Anticipated Discharge Disposition (OT) home with assist  -     Row Name 06/28/22 1532          Vital Signs    Pre  Systolic BP Rehab --  RN cleared for tx; VSS  -HK     Pre SpO2 (%) 94  -HK     O2 Delivery Pre Treatment room air  -HK     Intra SpO2 (%) 83  -HK     O2 Delivery Intra Treatment room air  -HK     Post SpO2 (%) 95  -HK     O2 Delivery Post Treatment room air  -HK     Pre Patient Position Sitting  -HK     Intra Patient Position Standing  -HK     Post Patient Position Sitting  -HK     Row Name 06/28/22 1532          Positioning and Restraints    Pre-Treatment Position sitting in chair/recliner  -HK     Post Treatment Position chair  -HK     In Chair notified nsg;reclined;call light within reach;encouraged to call for assist;exit alarm on  -HK           User Key  (r) = Recorded By, (t) = Taken By, (c) = Cosigned By    Initials Name Provider Type    Shannon Hu, OT Occupational Therapist               Outcome Measures     Row Name 06/28/22 4509          How much help from another is currently needed...    Putting on and taking off regular lower body clothing? 2  -HK     Bathing (including washing, rinsing, and drying) 2  -HK     Toileting (which includes using toilet bed pan or urinal) 3  -HK     Putting on and taking off regular upper body clothing 2  -HK     Taking care of personal grooming (such as brushing teeth) 3  -HK     Eating meals 3  -HK     AM-PAC 6 Clicks Score (OT) 15  -HK     Row Name 06/28/22 0820          How much help from another person do you currently need...    Turning from your back to your side while in flat bed without using bedrails? 3  -TS     Moving from lying on back to sitting on the side of a flat bed without bedrails? 3  -TS     Moving to and from a bed to a chair (including a wheelchair)? 3  -TS     Standing up from a chair using your arms (e.g., wheelchair, bedside chair)? 3  -TS     Climbing 3-5 steps with a railing? 2  -TS     To walk in hospital room? 3  -TS     AM-PAC 6 Clicks Score (PT) 17  -TS     Highest level of mobility 5 --> Static standing  -TS     Row Name 06/28/22 3762           Functional Assessment    Outcome Measure Options AM-PAC 6 Clicks Daily Activity (OT)  -           User Key  (r) = Recorded By, (t) = Taken By, (c) = Cosigned By    Initials Name Provider Type     Shannon Castro OT Occupational Therapist    Vika Santacruz, RN Registered Nurse                Occupational Therapy Education                 Title: PT OT SLP Therapies (In Progress)     Topic: Occupational Therapy (In Progress)     Point: ADL training (Done)     Description:   Instruct learner(s) on proper safety adaptation and remediation techniques during self care or transfers.   Instruct in proper use of assistive devices.              Learning Progress Summary           Patient Acceptance, E,D,TB,H, VU,DU by  at 6/28/2022 1548   Family Acceptance, E,D,TB,H, VU,DU by  at 6/28/2022 1548                   Point: Home exercise program (Not Started)     Description:   Instruct learner(s) on appropriate technique for monitoring, assisting and/or progressing therapeutic exercises/activities.              Learner Progress:  Not documented in this visit.          Point: Precautions (Done)     Description:   Instruct learner(s) on prescribed precautions during self-care and functional transfers.              Learning Progress Summary           Patient Acceptance, E,D,TB,H, VU,DU by  at 6/28/2022 1548   Family Acceptance, E,D,TB,H, VU,DU by  at 6/28/2022 1548                   Point: Body mechanics (Done)     Description:   Instruct learner(s) on proper positioning and spine alignment during self-care, functional mobility activities and/or exercises.              Learning Progress Summary           Patient Acceptance, E,D,TB,H, VU,DU by  at 6/28/2022 1548   Family Acceptance, E,D,TB,H, VU,DU by  at 6/28/2022 1548                               User Key     Initials Effective Dates Name Provider Type [facility] 06/16/21 -  Shannon Castro OT Occupational Therapist OT              OT  Recommendation and Plan  Planned Therapy Interventions (OT): adaptive equipment training, BADL retraining, functional balance retraining, occupation/activity based interventions, ROM/therapeutic exercise, transfer/mobility retraining  Therapy Frequency (OT): daily  Plan of Care Review  Plan of Care Reviewed With: patient  Progress: improving  Outcome Evaluation: OT eval complete. Pt is pleasent and cooperative. Pt and wife educated on sling management as well as wear and care, shoulder precautions, axilla care, angie dressing and care of nerve catheter during ADLS. Wife with excellent teach back on all sling management and provided modA for pt to don pullover shirt via angie dressing. Pt completed AROM x10 reps elbow/wrist/hand/scapula. Wife provided maxA for all axilla care and to don pants. Pt ambulated approximately 300 feet with CGA and no noted LOB. Pt with decreased O2 saturations to 83%. Improves with PLB as well as rest breaks. Recommend d/c home with assist from wife when medically ready.     Time Calculation:    Time Calculation- OT     Row Name 06/28/22 1348             Time Calculation- OT    OT Start Time 1348  -HK      OT Received On 06/28/22  -HK      OT Goal Re-Cert Due Date 07/08/22  -HK              Timed Charges    27892 - OT Therapeutic Exercise Minutes 10  -HK      88580 - OT Self Care/Mgmt Minutes 15  -HK              Untimed Charges    OT Eval/Re-eval Minutes 56  -HK              Total Minutes    Timed Charges Total Minutes 25  -HK      Untimed Charges Total Minutes 56  -HK       Total Minutes 81  -HK            User Key  (r) = Recorded By, (t) = Taken By, (c) = Cosigned By    Initials Name Provider Type     Shannon Castro OT Occupational Therapist              Therapy Charges for Today     Code Description Service Date Service Provider Modifiers Qty    [id number]  OT SELF CARE/MGMT/TRAIN EA 15 MIN 6/28/2022 Shannon Castro OT GO 1    [id number]  OT THER PROC EA 15 MIN 6/28/2022 Matthew  Shannon HURTADO, OT GO 1    [id number]  OT EVAL MOD COMPLEXITY 4 6/28/2022 Shannon Castro OT GO 1               Shannon Castro OT  6/28/2022

## 2022-06-28 NOTE — DISCHARGE INSTRUCTIONS
InfuBLOCK - Patient Information    What is a pain pump?  InfuBLOCK is a postoperative, non-narcotic pain relief system that delivers local anesthetic to or near the surgical site. This is a pain minimizing therapy that delivers an anesthetic (numbing) medicine to the nerve.    The InfuBLOCK pain pump will continuously deliver a local anesthetic medication to block the pain in the area of your procedure.    Where can I find information about my pain pump?           For more information about your pain pump, scan the QR code.  For additional patient resources, visit [email or web address]/resources-[email or web address]-management.                                                         The [employer] Nursing Hotline is Here for You 24/7.     Call [phone] for Assistance.  While your physician is your primary source for information about your treatment., there may be times during your treatment that you need assistance with your infusion pump. Our team of compassionate and knowledgeable Registered Nursed (RN) is here to assist every step of the way.    Answers to questions about your infusion pump                 Tubing disconnect  Assistance with pump alarms                                                      Dislodged catheter  Excessive leakage noted from pump                                         Inadequate pain control   SMI COLD THERAPY - PATIENT INSTRUCTION SHEET    Cold Compression Therapy for your comfort and rehabilitation  Your caregivers want you to be productive in your rehab and comfortable during your stay. In keeping with those goals, you will be receiving an SMI Cold Therapy Wrap to help ease post-operative pain and swelling that might keep you from getting back on track! Your SMI Cold Therapy Wrap is effective and simple-to-use, and you will be encouraged to apply it throughout your hospital stay and at home through the duration of your recovery.    When you are ready to go home  Be sure to take your SMI Cold  Therapy Wrap and both sets of Gel Bags with you for continued comfort and use throughout your rehabilitation. If you don't already have them, ask your nurse or aide to retrieve your SMI Gel Bags from the patient freezer.    Home use precautions  Always follow your medical professional's application instructions upon discharge. Your SMI Cold Therapy Wrap and Gel Bags are designed to last for months following your surgery. Never heat the Gel Bags unless specified by your healthcare provider. Supervision is advised when using this product on children or geriatric patients. To avoid danger of suffocation, please keep the outer plastic packaging away from children & pets.    Cold Therapy Instructions  Place Gel Bags in a freezer set ¾ of the way to max temperature for at least (4) hours. For best results, lay the Gel Bags flat and [id number] in the freezer. Once frozen, slide Gel Bags into the gel pouch and secure your wrap to the affected area with the straps.  Gel wraps that have been stored in a freezer for an extended period of time may require a (10) minute period of softening up in a room temperature environment before application.  The gel pouch acts as a protective barrier. NEVER place frozen bags directly onto skin, as this may cause frostbite injury.  The SMI Cold Therapy Wrap is designed to be able to be worm while ambulating. The compression straps can be secured well enough so that the Wrap won't fall off while moving.  Wrap Application Videos can be viewed at [email or web address].  An additional protective barrier such as clothing, a washcloth, hand-towel or pillowcase may be used during prolonged treatment applications.  The Gel-Pouch and Wrap are both Latex-Free and the Gel Bag ingredients are non toxic.    SMI Wrap care instructions  The SMI Cold Therapy Wrap may be hand washed and hung to dry when needed.    [facility] re-order information  Additional SMI body specific wraps and/or Gel Bags can be  re-ordered from [email or web address] or call [phone] ([phone])

## 2022-06-28 NOTE — DISCHARGE INSTR - ACTIVITY
SLING TO AFFECTED SHOULDER AT ALL TIMES EXCEPT HYGIENE AND REMOVE STRAPS WHEN SEATED    Take it easy until your nerve block comes out!    Monitor, spot check O2 at home

## 2022-06-29 NOTE — PROGRESS NOTES
SIENNA Rivas    Nerve Cath Post Op Call    Patient Name: Perla Adair  :  1955  MRN:  [id number]  Date of Discharge: 2022    Nerve Cath Post Op Call:    Pain Score:4/10  Side Effects:None  Catheter Plan:Will continue with plan at home without changes  Patient/Family instructed to call ON CALL anesthesia provider for any questions or problems.  Patient Follow Up:    Tylenol use: no acetaminophen use (instructed to take yet not exceed 4 grams/24hr period)

## 2022-06-30 LAB
BACTERIA SPEC AEROBE CULT: NORMAL
BACTERIA SPEC AEROBE CULT: NORMAL
GRAM STN SPEC: NORMAL

## 2022-07-07 LAB
BACTERIA SPEC ANAEROBE CULT: NORMAL
BACTERIA SPEC ANAEROBE CULT: NORMAL

## 2022-07-12 LAB
FUNGUS WND CULT: NORMAL
MYCOBACTERIUM SPEC CULT: NORMAL
NIGHT BLUE STAIN TISS: NORMAL

## 2022-07-20 ENCOUNTER — OFFICE VISIT (OUTPATIENT)
Dept: NEUROLOGY | Facility: CLINIC | Age: 67
End: 2022-07-20

## 2022-07-20 VITALS — HEART RATE: 76 BPM | BODY MASS INDEX: 36.99 KG/M2 | HEIGHT: 62 IN | WEIGHT: 201 LBS | OXYGEN SATURATION: 97 %

## 2022-07-20 DIAGNOSIS — G44.229 CHRONIC TENSION-TYPE HEADACHE, NOT INTRACTABLE: ICD-10-CM

## 2022-07-20 DIAGNOSIS — G43.719 INTRACTABLE CHRONIC MIGRAINE WITHOUT AURA AND WITHOUT STATUS MIGRAINOSUS: Primary | ICD-10-CM

## 2022-07-20 PROCEDURE — 99214 OFFICE O/P EST MOD 30 MIN: CPT | Performed by: PSYCHIATRY & NEUROLOGY

## 2022-07-20 RX ORDER — RIMEGEPANT SULFATE 75 MG/75MG
75 TABLET, ORALLY DISINTEGRATING ORAL AS NEEDED
Qty: 8 TABLET | Refills: 3 | Status: SHIPPED | OUTPATIENT
Start: 2022-07-20 | End: 2023-03-31

## 2022-07-20 NOTE — PROGRESS NOTES
Subjective:    CC: Perla Adair is in clinic today for follow up for intractable migraines and tension type headaches.    HPI:  Initial visit: 5/9/2022: Patient is a 66-year-old female with history of migraines and tension headaches referred to the clinic to establish care for migraine management.  She reports that she has had migraines since age 10.  She reports that she has tried several migraine preventative treatments including amitriptyline, nortriptyline, Topamax, Depakote, Aimovig, Ajovy, Emgality but none of them have helped.  She has also tried Botox x3 which actually made her migraines worse.  She currently reports on an average 15 migraine days in a month.  In addition to this, she has nonmigraine type tension headaches occurring almost on a daily basis.  She reports that she has associated photophobia, phonophobia and nausea associated with migraines.  Nonmigraine type tension headaches are mild to moderate in intensity and she usually wakes up with this type of headache.  She usually can carry on with her routine with this type of headache.  She is currently taking Maxalt 10 mg as needed as an abortive treatment which sometimes works and sometimes does not.  She has tried and failed Imitrex, Relpax, Zomig.  She has also tried and failed Ubrelvy and Nurtec.  She reports that she was given some samples of Qulipta 60 mg to be taken every day which she tried for a month and it did help reduce migraine intensity and frequency however insurance did not approve Qulipta.    Follow-up: 7/20/2022: She is in clinic for regular follow-up.  Since her initial visit in May 2022, she reports that she did take Qulipta 60 mg daily samples that I gave her for about a month and it did help reduce migraine frequency and intensity significantly however following that, she reports that she did not hear anything from pharmacy.  Prescription was sent to [facility] in [place].  She reports that she gets about 3-4  migraines in a week and almost all daily nonmigraine type/tension headache.    The following portions of the patient's history were reviewed and updated as of 07/20/2022: allergies, social history and problem list.       Current Outpatient Medications:   •  aspirin-acetaminophen-caffeine (Excedrin Extra Strength) 250-250-65 MG per tablet, Excedrin Extra Strength, Disp: , Rfl:   •  atorvastatin (LIPITOR) 10 MG tablet, Take 10 mg by mouth Daily., Disp: , Rfl:   •  escitalopram (LEXAPRO) 20 MG tablet, 20 mg., Disp: , Rfl:   •  fexofenadine (ALLEGRA) 180 MG tablet, Allergy Relief (fexofenadine) 180 mg tablet, Disp: , Rfl:   •  frovatriptan (Frova) 2.5 MG tablet, Take 1 tablet by mouth 1 (One) Time As Needed for Migraine for up to 30 doses. If recurs, may repeat after 2 hours. Max of 3 tabs in 24 hours., Disp: 10 tablet, Rfl: 3  •  irbesartan (AVAPRO) 75 MG tablet, Take 75 mg by mouth Every Night., Disp: , Rfl:   •  Mirabegron (MYRBETRIQ PO), Take 1 tablet by mouth Daily., Disp: , Rfl:   •  omeprazole (priLOSEC) 40 MG capsule, Take 40 mg by mouth 2 (Two) Times a Day., Disp: , Rfl:   •  oxyCODONE (ROXICODONE) 5 MG immediate release tablet, Take 1 tablet by mouth Every 4 (Four) Hours As Needed for Moderate Pain ., Disp: 25 tablet, Rfl: 0  •  QUEtiapine (SEROquel) 25 MG tablet, Take 75 mg by mouth Every Night., Disp: , Rfl:   •  venlafaxine 225 MG tablet sustained-release 24 hour 24 hr tablet, 225 mg Daily With Breakfast., Disp: , Rfl:   •  Rimegepant Sulfate (Nurtec) 75 MG tablet dispersible tablet, Take 1 tablet by mouth As Needed (MIGRAINE)., Disp: 8 tablet, Rfl: 3   Past Medical History:   Diagnosis Date   • Anxiety    • Arthritis    • Asthma    • CTS (carpal tunnel syndrome)    • Depression    • Dizziness    • Fractures    • GERD (gastroesophageal reflux disease)    • Headache, tension-type    • Hypertension    • Lung cancer (HCC) 05/2016   • Migraine    • PTSD (post-traumatic stress disorder)    • Shingles    • Sleep  "apnea     CPAP nightly   • Stomach problems     diverticulosis   • Syncope    • Urinary incontinence       Past Surgical History:   Procedure Laterality Date   • ANKLE OPEN REDUCTION INTERNAL FIXATION Bilateral    • APPENDECTOMY     • CHOLECYSTECTOMY     • COLONOSCOPY     • FINGER SURGERY      Re attachment Left thumb   • HYSTERECTOMY      complete   • JOINT REPLACEMENT Left     knee   • LUMBAR FUSION      L3-S1   • LUNG LOBECTOMY      right upper portion   • SCAPULA OPEN REDUCTION INTERNAL FIXATION Left 6/27/2022    Procedure: SCAPULA OPEN REDUCTION INTERNAL FIXATION WITH ALLOGRAFT BONE GRAFT- LEFT;  Surgeon: Carson Ashley MD;  Location: [facility];  Service: Orthopedics;  Laterality: Left;   • SHOULDER SURGERY      RCR - 3 Right  1 left      Family History   Problem Relation Age of Onset   • Cancer Mother    • Diabetes Mother    • Hypertension Mother    • Heart attack Mother    • Colon cancer Mother    • Heart disease Mother    • Neuropathy Mother    • Obesity Mother    • Alcohol abuse Sister    • Colon cancer Sister    • Dementia Sister    • Heart disease Sister    • Migraine headaches Sister    • Obesity Sister    • Osteosclerosis Sister    • Alcohol abuse Brother    • Alzheimer's disease Brother    • Stomach cancer Brother    • Dementia Brother    • Heart disease Brother    • Mental illness Brother    • Neuropathy Brother    • Obesity Brother    • Suicidality Brother         Review of Systems   Constitutional: Negative.    HENT: Negative.    Eyes: Negative.    Respiratory: Negative.    Cardiovascular: Negative.    Gastrointestinal: Positive for nausea.   Endocrine: Negative.    Genitourinary: Negative.    Musculoskeletal: Negative.    Skin: Negative.    Allergic/Immunologic: Negative.    Neurological: Positive for headache.   Hematological: Negative.    Psychiatric/Behavioral: Negative.      Objective:    Pulse 76   Ht 157.5 cm (62\")   Wt 91.2 kg (201 lb)   SpO2 97%   BMI 36.76 kg/m²     Neurology " Exam:  General apperance: NAD.     Mental status: Alert, awake and oriented to time place and person.    Recent and Remote memory: Can recall 3/3 objects at 5 minutes. Can recall historical events.     Attention span and Concentration: Serial 7s: Normal.     Fund of knowledge:  Normal.     Language and Speech: No aphasia or dysarthria.    Naming , Repitition and Comprehension:  Can name objects, repeat a sentence and follow commands. Speech is clear and fluent with good repetition, comprehension, and naming.    CN II to XII: Intact.    Opthalmoscopic Exam: No papilledema.    Motor:  Right UE muscle strength 5/5. Normal tone.     Left UE muscle strength 5/5. Normal tone.      Right LE muscle strength5/5. Normal tone.     Left LE muscle strength 5/5. Normal tone.      Sensory: Normal light touch, vibration and pinprick sensation bilaterally.    DTRs: 2+ bilaterally.    Babinski: Negative bilaterally.    Co-ordination: Normal finger-to-nose, heel to shin B/L.    Rhomberg: Negative.    Gait: Normal.    Cardiovascular: Regular rate and rhythm without murmur, gallop or rub.    Assessment and Plan:  1. Intractable chronic migraine without aura and without status migrainosus  2. Chronic tension-type headache, not intractable  -She has tried and failed oral oral preventative treatments including Topamax, Depakote, amitriptyline, nortriptyline.  She is currently on antihypertensive and hence beta-blocker or ARB is not a good option to add on.  She has tried and failed Aimovig, Ajovy and Emgality as well.  She has tried Botox once and it was not helpful.  I have given her some more samples of Qulipta and my office will contact [employer] to find the status of Qulipta approval.  I am also sending prescription of Nurtec to be taken as needed as an abortive treatment as currently she is getting only 3 tablets of frovatriptan in a month.  Frovatriptan does help otherwise, I will plan to see her back in 6 weeks for follow-up.        I spent 30 minutes in patient care: Reviewing records prior to the visit, entering orders and documentation and spent more than burr 50% of this time face-to-face in management, instructions and education regarding above mentioned diagnosis and also on counseling and discussing about taking medication regularly, possible side effects with medication use, importance of good sleep hygiene, good hydration and regular exercise.    Return in about 6 weeks (around 8/31/2022).

## 2022-08-08 LAB
FUNGUS WND CULT: NORMAL
FUNGUS WND CULT: NORMAL
MYCOBACTERIUM SPEC CULT: NORMAL
NIGHT BLUE STAIN TISS: NORMAL

## 2022-08-31 ENCOUNTER — OFFICE VISIT (OUTPATIENT)
Dept: NEUROLOGY | Facility: CLINIC | Age: 67
End: 2022-08-31

## 2022-08-31 ENCOUNTER — PRIOR AUTHORIZATION (OUTPATIENT)
Dept: NEUROLOGY | Facility: CLINIC | Age: 67
End: 2022-08-31

## 2022-08-31 VITALS — HEIGHT: 62 IN | BODY MASS INDEX: 37.91 KG/M2 | WEIGHT: 206 LBS

## 2022-08-31 DIAGNOSIS — G44.229 CHRONIC TENSION-TYPE HEADACHE, NOT INTRACTABLE: Primary | ICD-10-CM

## 2022-08-31 DIAGNOSIS — G43.119 INTRACTABLE MIGRAINE WITH AURA WITHOUT STATUS MIGRAINOSUS: ICD-10-CM

## 2022-08-31 PROCEDURE — 99214 OFFICE O/P EST MOD 30 MIN: CPT | Performed by: PSYCHIATRY & NEUROLOGY

## 2022-08-31 NOTE — PROGRESS NOTES
Subjective:    CC: Perla Adair is in clinic today for follow up for intractable migraines and tension headaches.    HPI:  Initial visit: 5/9/2022: Patient is a 66-year-old female with history of migraines and tension headaches referred to the clinic to establish care for migraine management.  She reports that she has had migraines since age 10.  She reports that she has tried several migraine preventative treatments including amitriptyline, nortriptyline, Topamax, Depakote, Aimovig, Ajovy, Emgality but none of them have helped.  She has also tried Botox x3 which actually made her migraines worse.  She currently reports on an average 15 migraine days in a month.  In addition to this, she has nonmigraine type tension headaches occurring almost on a daily basis.  She reports that she has associated photophobia, phonophobia and nausea associated with migraines.  Nonmigraine type tension headaches are mild to moderate in intensity and she usually wakes up with this type of headache.  She usually can carry on with her routine with this type of headache.  She is currently taking Maxalt 10 mg as needed as an abortive treatment which sometimes works and sometimes does not.  She has tried and failed Imitrex, Relpax, Zomig.  She has also tried and failed Ubrelvy and Nurtec.  She reports that she was given some samples of Qulipta 60 mg to be taken every day which she tried for a month and it did help reduce migraine intensity and frequency however insurance did not approve Qulipta.    Follow-up: 7/20/2022: She is in clinic for regular follow-up.  Since her initial visit in May 2022, she reports that she did take Qulipta 60 mg daily samples that I gave her for about a month and it did help reduce migraine frequency and intensity significantly however following that, she reports that she did not hear anything from pharmacy.  Prescription was sent to [facility] in [place].  She reports that she gets about 3-4 migraines  in a week and almost all daily nonmigraine type/tension headache.    Follow-up: 8/31/2022: She is in clinic for regular follow-up.  Since her last visit in July 2022, she reports that she continues to take Qulipta 60 mg samples that was given to her on the last visit.  She reports that she has had excellent response and the migraine frequency is reduced to 3-4 migraine days in a month from 10-15 migraine days.  She is now also using Nurtec which was prescribed on the last visit and reports that it has worked well as an abortive treatment.  Insurance has still not approved the medication.    The following portions of the patient's history were reviewed and updated as of 08/31/2022: allergies, social history and problem list.       Current Outpatient Medications:   •  aspirin-acetaminophen-caffeine (Excedrin Extra Strength) 250-250-65 MG per tablet, Excedrin Extra Strength, Disp: , Rfl:   •  atorvastatin (LIPITOR) 10 MG tablet, Take 10 mg by mouth Daily., Disp: , Rfl:   •  escitalopram (LEXAPRO) 20 MG tablet, 20 mg., Disp: , Rfl:   •  fexofenadine (ALLEGRA) 180 MG tablet, Allergy Relief (fexofenadine) 180 mg tablet, Disp: , Rfl:   •  frovatriptan (Frova) 2.5 MG tablet, Take 1 tablet by mouth 1 (One) Time As Needed for Migraine for up to 30 doses. If recurs, may repeat after 2 hours. Max of 3 tabs in 24 hours., Disp: 10 tablet, Rfl: 3  •  irbesartan (AVAPRO) 75 MG tablet, Take 75 mg by mouth Every Night., Disp: , Rfl:   •  Mirabegron (MYRBETRIQ PO), Take 1 tablet by mouth Daily., Disp: , Rfl:   •  omeprazole (priLOSEC) 40 MG capsule, Take 40 mg by mouth 2 (Two) Times a Day., Disp: , Rfl:   •  oxyCODONE (ROXICODONE) 5 MG immediate release tablet, Take 1 tablet by mouth Every 4 (Four) Hours As Needed for Moderate Pain ., Disp: 25 tablet, Rfl: 0  •  QUEtiapine (SEROquel) 25 MG tablet, Take 75 mg by mouth Every Night., Disp: , Rfl:   •  Rimegepant Sulfate (Nurtec) 75 MG tablet dispersible tablet, Take 1 tablet by mouth As  Needed (MIGRAINE)., Disp: 8 tablet, Rfl: 3  •  venlafaxine 225 MG tablet sustained-release 24 hour 24 hr tablet, 225 mg Daily With Breakfast., Disp: , Rfl:    Past Medical History:   Diagnosis Date   • Anxiety    • Arthritis    • Asthma    • CTS (carpal tunnel syndrome)    • Depression    • Dizziness    • Fractures    • GERD (gastroesophageal reflux disease)    • Headache, tension-type    • Hypertension    • Lung cancer (HCC) 05/2016   • Migraine    • PTSD (post-traumatic stress disorder)    • Shingles    • Sleep apnea     CPAP nightly   • Stomach problems     diverticulosis   • Syncope    • Urinary incontinence       Past Surgical History:   Procedure Laterality Date   • ANKLE OPEN REDUCTION INTERNAL FIXATION Bilateral    • APPENDECTOMY     • CHOLECYSTECTOMY     • COLONOSCOPY     • FINGER SURGERY      Re attachment Left thumb   • HYSTERECTOMY      complete   • JOINT REPLACEMENT Left     knee   • LUMBAR FUSION      L3-S1   • LUNG LOBECTOMY      right upper portion   • SCAPULA OPEN REDUCTION INTERNAL FIXATION Left 6/27/2022    Procedure: SCAPULA OPEN REDUCTION INTERNAL FIXATION WITH ALLOGRAFT BONE GRAFT- LEFT;  Surgeon: Carson Ashley MD;  Location: [facility];  Service: Orthopedics;  Laterality: Left;   • SHOULDER SURGERY      RCR - 3 Right  1 left      Family History   Problem Relation Age of Onset   • Cancer Mother    • Diabetes Mother    • Hypertension Mother    • Heart attack Mother    • Colon cancer Mother    • Heart disease Mother    • Neuropathy Mother    • Obesity Mother    • Alcohol abuse Sister    • Colon cancer Sister    • Dementia Sister    • Heart disease Sister    • Migraine headaches Sister    • Obesity Sister    • Osteosclerosis Sister    • Alcohol abuse Brother    • Alzheimer's disease Brother    • Stomach cancer Brother    • Dementia Brother    • Heart disease Brother    • Mental illness Brother    • Neuropathy Brother    • Obesity Brother    • Suicidality Brother         Review of Systems  "  Constitutional: Negative.    HENT: Negative.    Eyes: Positive for photophobia.   Respiratory: Negative.    Cardiovascular: Negative.    Gastrointestinal: Positive for nausea and vomiting.   Endocrine: Negative.    Genitourinary: Negative.    Musculoskeletal: Negative.    Skin: Negative.    Allergic/Immunologic: Negative.    Neurological: Negative.    Hematological: Negative.    Psychiatric/Behavioral: Negative.      Objective:    Ht 157.5 cm (62\")   Wt 93.4 kg (206 lb)   BMI 37.68 kg/m²     Neurology Exam:  General apperance: NAD.     Mental status: Alert, awake and oriented to time place and person.    Recent and Remote memory: Can recall 3/3 objects at 5 minutes. Can recall historical events.     Attention span and Concentration: Serial 7s: Normal.     Fund of knowledge:  Normal.     Language and Speech: No aphasia or dysarthria.    Naming , Repitition and Comprehension:  Can name objects, repeat a sentence and follow commands. Speech is clear and fluent with good repetition, comprehension, and naming.    CN II to XII: Intact.    Opthalmoscopic Exam: No papilledema.    Motor:  Right UE muscle strength 5/5. Normal tone.     Left UE muscle strength 5/5. Normal tone.      Right LE muscle strength5/5. Normal tone.     Left LE muscle strength 5/5. Normal tone.      Sensory: Normal light touch, vibration and pinprick sensation bilaterally.    DTRs: 2+ bilaterally.    Babinski: Negative bilaterally.    Co-ordination: Normal finger-to-nose, heel to shin B/L.    Rhomberg: Negative.    Gait: Normal.    Cardiovascular: Regular rate and rhythm without murmur, gallop or rub.    Assessment and Plan:  1. Chronic tension-type headache, not intractable  2. Intractable migraine with aura without status migrainosus  She has responded really well to Qulipta 60 mg and migraine frequency is reduced to 3-4 migraine days in a month from 10-15 migraine days.  She has tried and failed several oral preventative treatments in the past.  " She has failed Aimovig, Ajovy and Emgality as well as Botox in the past.  It is medically necessary for her to stay on Qulipta.  My office will be going prior authorization again to have this approved.  I have given her some more samples to use until then and I will plan to see her back in 3 months for follow-up.       I spent 30 minutes in patient care: Reviewing records prior to the visit, entering orders and documentation and spent more than burr 50% of this time face-to-face in management, instructions and education regarding above mentioned diagnosis and also on counseling and discussing about taking medication regularly, possible side effects with medication use, importance of good sleep hygiene, good hydration and regular exercise.    Return in about 3 months (around 11/30/2022).

## 2022-11-09 ENCOUNTER — PRE-ADMISSION TESTING (OUTPATIENT)
Dept: PREADMISSION TESTING | Facility: HOSPITAL | Age: 67
End: 2022-11-09

## 2022-11-09 ENCOUNTER — HOSPITAL ENCOUNTER (OUTPATIENT)
Dept: GENERAL RADIOLOGY | Facility: HOSPITAL | Age: 67
Discharge: HOME | End: 2022-11-09

## 2022-11-09 VITALS — WEIGHT: 211.09 LBS | HEIGHT: 62 IN | BODY MASS INDEX: 38.85 KG/M2

## 2022-11-09 LAB
ANION GAP SERPL CALCULATED.3IONS-SCNC: 9 MMOL/L (ref 5–15)
BUN SERPL-MCNC: 10 MG/DL (ref 8–23)
BUN/CREAT SERPL: 13.2 (ref 7–25)
CALCIUM SPEC-SCNC: 9.5 MG/DL (ref 8.6–10.5)
CHLORIDE SERPL-SCNC: 107 MMOL/L (ref 98–107)
CO2 SERPL-SCNC: 28 MMOL/L (ref 22–29)
CREAT SERPL-MCNC: 0.76 MG/DL (ref 0.57–1)
DEPRECATED RDW RBC AUTO: 48.4 FL (ref 37–54)
EGFRCR SERPLBLD CKD-EPI 2021: 86.5 ML/MIN/1.73
ERYTHROCYTE [DISTWIDTH] IN BLOOD BY AUTOMATED COUNT: 14.5 % (ref 12.3–15.4)
GLUCOSE SERPL-MCNC: 100 MG/DL (ref 65–99)
HBA1C MFR BLD: 5 % (ref 4.8–5.6)
HCT VFR BLD AUTO: 43.3 % (ref 34–46.6)
HGB BLD-MCNC: 14.5 G/DL (ref 12–15.9)
MCH RBC QN AUTO: 30.6 PG (ref 26.6–33)
MCHC RBC AUTO-ENTMCNC: 33.5 G/DL (ref 31.5–35.7)
MCV RBC AUTO: 91.4 FL (ref 79–97)
PLATELET # BLD AUTO: 266 10*3/MM3 (ref 140–450)
PMV BLD AUTO: 9.8 FL (ref 6–12)
POTASSIUM SERPL-SCNC: 4.9 MMOL/L (ref 3.5–5.2)
RBC # BLD AUTO: 4.74 10*6/MM3 (ref 3.77–5.28)
SODIUM SERPL-SCNC: 144 MMOL/L (ref 136–145)
WBC NRBC COR # BLD: 8.21 10*3/MM3 (ref 3.4–10.8)

## 2022-11-09 PROCEDURE — 83036 HEMOGLOBIN GLYCOSYLATED A1C: CPT

## 2022-11-09 PROCEDURE — 80048 BASIC METABOLIC PNL TOTAL CA: CPT

## 2022-11-09 PROCEDURE — 71046 X-RAY EXAM CHEST 2 VIEWS: CPT

## 2022-11-09 PROCEDURE — 36415 COLL VENOUS BLD VENIPUNCTURE: CPT

## 2022-11-09 PROCEDURE — 85027 COMPLETE CBC AUTOMATED: CPT

## 2022-11-09 NOTE — PAT
An arrival time for procedure was not provided during PAT visit. If patient had any questions or concerns about their arrival time, they were instructed to contact their surgeon/physician.  Additionally, if the patient referred to an arrival time that was acquired from their my chart account, patient was encouraged to verify that time with their surgeon/physician. Arrival times are NOT provided in Pre Admission Testing Department.    Per Anesthesia Request, patient instructed not to take their ACE/ARB medications on the AM of surgery.    Patient instructed to drink 20 ounces of Gatorade and it needs to be completed 1 hour (for Main OR patients) or 2 hours (scheduled  section & [facility] patients) before given arrival time for procedure (NO RED Gatorade)    Patient verbalized understanding.    Patient denies any current skin issues.     PATIENT STATES THAT SHE IS ALLERGIC TO BOTH CHG WIPES. SHE IS UNABLE TO USE THE WIPES IN THE ORANGE PACKAGES AS WELL AS UNABLE TO USE THE WIPES IN THE PURPLE PACKAGES. PATIENT ASKED TO USE DIAL SOAP TO PREP FOR SURGERY THE NIGHT BEFORE AND THE MORNING OF. PATIENT STATES THIS IS WHAT SHE WAS ASKED TO DO FOR HER OTHER SHOULDER SURGERY IN 2022.     PATIENT EKG ON CHART AND IN Epic FROM 2022

## 2022-11-13 ENCOUNTER — ANESTHESIA EVENT (OUTPATIENT)
Dept: PERIOP | Facility: HOSPITAL | Age: 67
End: 2022-11-13

## 2022-11-13 RX ORDER — FAMOTIDINE 10 MG/ML
20 INJECTION, SOLUTION INTRAVENOUS ONCE
Status: CANCELLED | OUTPATIENT
Start: 2022-11-13 | End: 2022-11-13

## 2022-11-14 ENCOUNTER — ANESTHESIA EVENT CONVERTED (OUTPATIENT)
Dept: ANESTHESIOLOGY | Facility: HOSPITAL | Age: 67
End: 2022-11-14

## 2022-11-14 ENCOUNTER — ANESTHESIA (OUTPATIENT)
Dept: PERIOP | Facility: HOSPITAL | Age: 67
End: 2022-11-14

## 2022-11-14 ENCOUNTER — APPOINTMENT (OUTPATIENT)
Dept: GENERAL RADIOLOGY | Facility: HOSPITAL | Age: 67
End: 2022-11-14

## 2022-11-14 ENCOUNTER — HOSPITAL ENCOUNTER (INPATIENT)
Facility: HOSPITAL | Age: 67
LOS: 2 days | Discharge: HOME | End: 2022-11-21
Attending: ORTHOPAEDIC SURGERY | Admitting: ORTHOPAEDIC SURGERY

## 2022-11-14 DIAGNOSIS — Z98.890 S/P ORIF (OPEN REDUCTION INTERNAL FIXATION) FRACTURE: Primary | ICD-10-CM

## 2022-11-14 DIAGNOSIS — Z87.81 S/P ORIF (OPEN REDUCTION INTERNAL FIXATION) FRACTURE: Primary | ICD-10-CM

## 2022-11-14 PROBLEM — S42.102A LEFT SCAPULA FRACTURE: Status: ACTIVE | Noted: 2022-11-14

## 2022-11-14 PROBLEM — F41.9 ANXIETY: Status: ACTIVE | Noted: 2022-11-14

## 2022-11-14 PROCEDURE — C1713 ANCHOR/SCREW BN/BN,TIS/BN: HCPCS | Performed by: ORTHOPAEDIC SURGERY

## 2022-11-14 PROCEDURE — 63710000001 ESCITALOPRAM 20 MG TABLET: Performed by: INTERNAL MEDICINE

## 2022-11-14 PROCEDURE — 63710000001 POVIDONE-IODINE 10 % SOLUTION 30 ML BOTTLE: Performed by: ORTHOPAEDIC SURGERY

## 2022-11-14 PROCEDURE — 76000 FLUOROSCOPY <1 HR PHYS/QHP: CPT

## 2022-11-14 PROCEDURE — 63710000001 FAMOTIDINE 20 MG TABLET: Performed by: ANESTHESIOLOGY

## 2022-11-14 PROCEDURE — A9270 NON-COVERED ITEM OR SERVICE: HCPCS | Performed by: INTERNAL MEDICINE

## 2022-11-14 PROCEDURE — 25010000002 FENTANYL CITRATE (PF) 50 MCG/ML SOLUTION

## 2022-11-14 PROCEDURE — 25010000002 PROPOFOL 10 MG/ML EMULSION: Performed by: ANESTHESIOLOGY

## 2022-11-14 PROCEDURE — A9270 NON-COVERED ITEM OR SERVICE: HCPCS | Performed by: ANESTHESIOLOGY

## 2022-11-14 PROCEDURE — A9270 NON-COVERED ITEM OR SERVICE: HCPCS | Performed by: ORTHOPAEDIC SURGERY

## 2022-11-14 PROCEDURE — 63710000001 CETIRIZINE 10 MG TABLET: Performed by: INTERNAL MEDICINE

## 2022-11-14 PROCEDURE — 25010000002 MIDAZOLAM PER 1 MG: Performed by: NURSE ANESTHETIST, CERTIFIED REGISTERED

## 2022-11-14 PROCEDURE — 63710000001 QUETIAPINE 25 MG TABLET: Performed by: INTERNAL MEDICINE

## 2022-11-14 PROCEDURE — 25010000002 DEXAMETHASONE SODIUM PHOSPHATE 10 MG/ML SOLUTION: Performed by: NURSE ANESTHETIST, CERTIFIED REGISTERED

## 2022-11-14 PROCEDURE — 25010000002 CEFAZOLIN IN DEXTROSE 2-4 GM/100ML-% SOLUTION: Performed by: ORTHOPAEDIC SURGERY

## 2022-11-14 PROCEDURE — 25010000002 CEFAZOLIN IN DEXTROSE 2-4 GM/100ML-% SOLUTION: Performed by: ANESTHESIOLOGY

## 2022-11-14 PROCEDURE — 25010000002 ROPIVACAINE PER 1 MG: Performed by: NURSE ANESTHETIST, CERTIFIED REGISTERED

## 2022-11-14 PROCEDURE — 63710000001 ATORVASTATIN 10 MG TABLET: Performed by: INTERNAL MEDICINE

## 2022-11-14 PROCEDURE — C1769 GUIDE WIRE: HCPCS | Performed by: ORTHOPAEDIC SURGERY

## 2022-11-14 PROCEDURE — 94640 AIRWAY INHALATION TREATMENT: CPT

## 2022-11-14 PROCEDURE — 25010000002 VANCOMYCIN 10 G RECONSTITUTED SOLUTION: Performed by: ORTHOPAEDIC SURGERY

## 2022-11-14 PROCEDURE — 25010000002 ONDANSETRON PER 1 MG: Performed by: ANESTHESIOLOGY

## 2022-11-14 PROCEDURE — 73030 X-RAY EXAM OF SHOULDER: CPT

## 2022-11-14 PROCEDURE — 25010000002 FENTANYL CITRATE (PF) 50 MCG/ML SOLUTION: Performed by: NURSE ANESTHETIST, CERTIFIED REGISTERED

## 2022-11-14 PROCEDURE — 63710000001: Performed by: INTERNAL MEDICINE

## 2022-11-14 PROCEDURE — 63710000001 ACETAMINOPHEN 500 MG TABLET: Performed by: ORTHOPAEDIC SURGERY

## 2022-11-14 PROCEDURE — 63710000001 LOSARTAN 25 MG TABLET: Performed by: INTERNAL MEDICINE

## 2022-11-14 DEVICE — DISTAL CLAVICLE PLT 3.5MM 9 HOLE, LEFT
Type: IMPLANTABLE DEVICE | Site: SCAPULA | Status: FUNCTIONAL
Brand: ACUMED

## 2022-11-14 DEVICE — 3.5MM X 26MM NON-LOCKING HEXALOBE SCREW
Type: IMPLANTABLE DEVICE | Site: SCAPULA | Status: FUNCTIONAL
Brand: ACUMED

## 2022-11-14 DEVICE — SCRW CORT S/TAP 3.5X12MM: Type: IMPLANTABLE DEVICE | Site: SCAPULA | Status: FUNCTIONAL

## 2022-11-14 DEVICE — 3.5MM X 24MM NON-LOCKING HEXALOBE SCREW
Type: IMPLANTABLE DEVICE | Site: SCAPULA | Status: FUNCTIONAL
Brand: ACUMED

## 2022-11-14 DEVICE — IMPLANTABLE DEVICE: Type: IMPLANTABLE DEVICE | Site: SCAPULA | Status: FUNCTIONAL

## 2022-11-14 DEVICE — ALLOGRFT BONE VIVIGEN CELLUAR MATRX FORMABLE 5CC: Type: IMPLANTABLE DEVICE | Site: SCAPULA | Status: FUNCTIONAL

## 2022-11-14 DEVICE — SCRW CORT S/TAP 3.5X10MM: Type: IMPLANTABLE DEVICE | Site: SCAPULA | Status: FUNCTIONAL

## 2022-11-14 DEVICE — 3.5MM X 12MM LOCKING HEXALOBE SCREW
Type: IMPLANTABLE DEVICE | Site: SCAPULA | Status: FUNCTIONAL
Brand: ACUMED

## 2022-11-14 DEVICE — SCRW CORT S/TAP 3.5X14MM: Type: IMPLANTABLE DEVICE | Site: SCAPULA | Status: FUNCTIONAL

## 2022-11-14 DEVICE — 3.5MM X 12MM NON-LOCKING HEXALOBE SCREW
Type: IMPLANTABLE DEVICE | Site: SCAPULA | Status: FUNCTIONAL
Brand: ACUMED

## 2022-11-14 RX ORDER — FENTANYL CITRATE 50 UG/ML
INJECTION, SOLUTION INTRAMUSCULAR; INTRAVENOUS
Status: COMPLETED
Start: 2022-11-14 | End: 2022-11-14

## 2022-11-14 RX ORDER — LABETALOL HYDROCHLORIDE 5 MG/ML
10 INJECTION, SOLUTION INTRAVENOUS EVERY 4 HOURS PRN
Status: DISCONTINUED | OUTPATIENT
Start: 2022-11-14 | End: 2022-11-20

## 2022-11-14 RX ORDER — ACETAMINOPHEN 650 MG/1
650 SUPPOSITORY RECTAL EVERY 4 HOURS PRN
Status: DISCONTINUED | OUTPATIENT
Start: 2022-11-14 | End: 2022-11-16

## 2022-11-14 RX ORDER — HYDROMORPHONE HYDROCHLORIDE 1 MG/ML
0.5 INJECTION, SOLUTION INTRAMUSCULAR; INTRAVENOUS; SUBCUTANEOUS
Status: DISCONTINUED | OUTPATIENT
Start: 2022-11-14 | End: 2022-11-21 | Stop reason: HOSPADM

## 2022-11-14 RX ORDER — SODIUM CHLORIDE 450 MG/100ML
50 INJECTION, SOLUTION INTRAVENOUS CONTINUOUS
Status: DISCONTINUED | OUTPATIENT
Start: 2022-11-14 | End: 2022-11-17

## 2022-11-14 RX ORDER — ALBUTEROL SULFATE 2.5 MG/3ML
2.5 SOLUTION RESPIRATORY (INHALATION) EVERY 6 HOURS PRN
Status: DISCONTINUED | OUTPATIENT
Start: 2022-11-14 | End: 2022-11-21 | Stop reason: HOSPADM

## 2022-11-14 RX ORDER — NALOXONE HCL 0.4 MG/ML
0.1 VIAL (ML) INJECTION
Status: DISCONTINUED | OUTPATIENT
Start: 2022-11-14 | End: 2022-11-21 | Stop reason: HOSPADM

## 2022-11-14 RX ORDER — MAGNESIUM HYDROXIDE 1200 MG/15ML
LIQUID ORAL AS NEEDED
Status: DISCONTINUED | OUTPATIENT
Start: 2022-11-14 | End: 2022-11-14 | Stop reason: HOSPADM

## 2022-11-14 RX ORDER — BUPIVACAINE HYDROCHLORIDE 2.5 MG/ML
INJECTION, SOLUTION EPIDURAL; INFILTRATION; INTRACAUDAL
Status: COMPLETED | OUTPATIENT
Start: 2022-11-14 | End: 2022-11-14

## 2022-11-14 RX ORDER — ROPIVACAINE HYDROCHLORIDE 2 MG/ML
INJECTION, SOLUTION EPIDURAL; INFILTRATION; PERINEURAL CONTINUOUS
Status: DISCONTINUED | OUTPATIENT
Start: 2022-11-14 | End: 2022-11-15

## 2022-11-14 RX ORDER — ROCURONIUM BROMIDE 10 MG/ML
INJECTION, SOLUTION INTRAVENOUS AS NEEDED
Status: DISCONTINUED | OUTPATIENT
Start: 2022-11-14 | End: 2022-11-14 | Stop reason: SURG

## 2022-11-14 RX ORDER — ATORVASTATIN CALCIUM 10 MG/1
10 TABLET, FILM COATED ORAL NIGHTLY
Status: DISCONTINUED | OUTPATIENT
Start: 2022-11-14 | End: 2022-11-21 | Stop reason: HOSPADM

## 2022-11-14 RX ORDER — CEFAZOLIN SODIUM 2 G/100ML
2 INJECTION, SOLUTION INTRAVENOUS ONCE
Status: COMPLETED | OUTPATIENT
Start: 2022-11-14 | End: 2022-11-14

## 2022-11-14 RX ORDER — QUETIAPINE FUMARATE 25 MG/1
75 TABLET, FILM COATED ORAL NIGHTLY
Status: DISCONTINUED | OUTPATIENT
Start: 2022-11-14 | End: 2022-11-21 | Stop reason: HOSPADM

## 2022-11-14 RX ORDER — RIZATRIPTAN BENZOATE 10 MG/1
10 TABLET, ORALLY DISINTEGRATING ORAL DAILY PRN
Status: DISCONTINUED | OUTPATIENT
Start: 2022-11-14 | End: 2022-11-21 | Stop reason: HOSPADM

## 2022-11-14 RX ORDER — SODIUM CHLORIDE, SODIUM LACTATE, POTASSIUM CHLORIDE, CALCIUM CHLORIDE 600; 310; 30; 20 MG/100ML; MG/100ML; MG/100ML; MG/100ML
9 INJECTION, SOLUTION INTRAVENOUS CONTINUOUS
Status: DISCONTINUED | OUTPATIENT
Start: 2022-11-14 | End: 2022-11-15

## 2022-11-14 RX ORDER — CETIRIZINE HYDROCHLORIDE 10 MG/1
10 TABLET ORAL DAILY
Status: DISCONTINUED | OUTPATIENT
Start: 2022-11-14 | End: 2022-11-21 | Stop reason: HOSPADM

## 2022-11-14 RX ORDER — DEXMEDETOMIDINE HYDROCHLORIDE 100 UG/ML
INJECTION, SOLUTION INTRAVENOUS AS NEEDED
Status: DISCONTINUED | OUTPATIENT
Start: 2022-11-14 | End: 2022-11-14 | Stop reason: SURG

## 2022-11-14 RX ORDER — SUMATRIPTAN 50 MG/1
50 TABLET, FILM COATED ORAL
Status: DISCONTINUED | OUTPATIENT
Start: 2022-11-14 | End: 2022-11-14

## 2022-11-14 RX ORDER — SODIUM CHLORIDE 0.9 % (FLUSH) 0.9 %
10 SYRINGE (ML) INJECTION AS NEEDED
Status: DISCONTINUED | OUTPATIENT
Start: 2022-11-14 | End: 2022-11-14 | Stop reason: HOSPADM

## 2022-11-14 RX ORDER — LIDOCAINE HYDROCHLORIDE 10 MG/ML
0.5 INJECTION, SOLUTION EPIDURAL; INFILTRATION; INTRACAUDAL; PERINEURAL ONCE AS NEEDED
Status: COMPLETED | OUTPATIENT
Start: 2022-11-14 | End: 2022-11-14

## 2022-11-14 RX ORDER — LOSARTAN POTASSIUM 25 MG/1
25 TABLET ORAL
Status: DISCONTINUED | OUTPATIENT
Start: 2022-11-14 | End: 2022-11-20

## 2022-11-14 RX ORDER — ACETAMINOPHEN 325 MG/1
650 TABLET ORAL EVERY 4 HOURS PRN
Status: DISCONTINUED | OUTPATIENT
Start: 2022-11-14 | End: 2022-11-16

## 2022-11-14 RX ORDER — ESCITALOPRAM OXALATE 20 MG/1
20 TABLET ORAL NIGHTLY
Status: DISCONTINUED | OUTPATIENT
Start: 2022-11-14 | End: 2022-11-21 | Stop reason: HOSPADM

## 2022-11-14 RX ORDER — PROPOFOL 10 MG/ML
VIAL (ML) INTRAVENOUS AS NEEDED
Status: DISCONTINUED | OUTPATIENT
Start: 2022-11-14 | End: 2022-11-14 | Stop reason: SURG

## 2022-11-14 RX ORDER — ALBUTEROL SULFATE 90 UG/1
2 AEROSOL, METERED RESPIRATORY (INHALATION) EVERY 4 HOURS PRN
COMMUNITY

## 2022-11-14 RX ORDER — EPHEDRINE SULFATE 50 MG/ML
INJECTION INTRAVENOUS AS NEEDED
Status: DISCONTINUED | OUTPATIENT
Start: 2022-11-14 | End: 2022-11-14 | Stop reason: SURG

## 2022-11-14 RX ORDER — FENTANYL CITRATE 50 UG/ML
INJECTION, SOLUTION INTRAMUSCULAR; INTRAVENOUS
Status: COMPLETED | OUTPATIENT
Start: 2022-11-14 | End: 2022-11-14

## 2022-11-14 RX ORDER — ONDANSETRON 2 MG/ML
INJECTION INTRAMUSCULAR; INTRAVENOUS AS NEEDED
Status: DISCONTINUED | OUTPATIENT
Start: 2022-11-14 | End: 2022-11-14 | Stop reason: SURG

## 2022-11-14 RX ORDER — FENTANYL CITRATE 50 UG/ML
50 INJECTION, SOLUTION INTRAMUSCULAR; INTRAVENOUS
Status: DISCONTINUED | OUTPATIENT
Start: 2022-11-14 | End: 2022-11-14 | Stop reason: HOSPADM

## 2022-11-14 RX ORDER — CEFAZOLIN SODIUM 2 G/100ML
INJECTION, SOLUTION INTRAVENOUS AS NEEDED
Status: DISCONTINUED | OUTPATIENT
Start: 2022-11-14 | End: 2022-11-14 | Stop reason: SURG

## 2022-11-14 RX ORDER — FAMOTIDINE 20 MG/1
20 TABLET, FILM COATED ORAL ONCE
Status: COMPLETED | OUTPATIENT
Start: 2022-11-14 | End: 2022-11-14

## 2022-11-14 RX ORDER — SODIUM CHLORIDE 0.9 % (FLUSH) 0.9 %
10 SYRINGE (ML) INJECTION EVERY 12 HOURS SCHEDULED
Status: DISCONTINUED | OUTPATIENT
Start: 2022-11-14 | End: 2022-11-14 | Stop reason: HOSPADM

## 2022-11-14 RX ORDER — ACETAMINOPHEN 500 MG
1000 TABLET ORAL ONCE
Status: COMPLETED | OUTPATIENT
Start: 2022-11-14 | End: 2022-11-14

## 2022-11-14 RX ORDER — TRANEXAMIC ACID 10 MG/ML
1000 INJECTION, SOLUTION INTRAVENOUS ONCE
Status: COMPLETED | OUTPATIENT
Start: 2022-11-14 | End: 2022-11-14

## 2022-11-14 RX ORDER — DEXAMETHASONE SODIUM PHOSPHATE 10 MG/ML
INJECTION, SOLUTION INTRAMUSCULAR; INTRAVENOUS
Status: COMPLETED | OUTPATIENT
Start: 2022-11-14 | End: 2022-11-14

## 2022-11-14 RX ORDER — OXYCODONE HYDROCHLORIDE 5 MG/1
5 TABLET ORAL EVERY 4 HOURS PRN
Status: DISCONTINUED | OUTPATIENT
Start: 2022-11-14 | End: 2022-11-16

## 2022-11-14 RX ORDER — LIDOCAINE HYDROCHLORIDE 10 MG/ML
INJECTION, SOLUTION EPIDURAL; INFILTRATION; INTRACAUDAL; PERINEURAL AS NEEDED
Status: DISCONTINUED | OUTPATIENT
Start: 2022-11-14 | End: 2022-11-14 | Stop reason: SURG

## 2022-11-14 RX ORDER — MIDAZOLAM HYDROCHLORIDE 1 MG/ML
0.5 INJECTION INTRAMUSCULAR; INTRAVENOUS
Status: DISCONTINUED | OUTPATIENT
Start: 2022-11-14 | End: 2022-11-14 | Stop reason: HOSPADM

## 2022-11-14 RX ORDER — MIDAZOLAM HYDROCHLORIDE 1 MG/ML
INJECTION INTRAMUSCULAR; INTRAVENOUS
Status: COMPLETED | OUTPATIENT
Start: 2022-11-14 | End: 2022-11-14

## 2022-11-14 RX ADMIN — QUETIAPINE FUMARATE 75 MG: 25 TABLET ORAL at 21:53

## 2022-11-14 RX ADMIN — FAMOTIDINE 20 MG: 20 TABLET ORAL at 10:14

## 2022-11-14 RX ADMIN — RIZATRIPTAN BENZOATE 10 MG: 10 TABLET, ORALLY DISINTEGRATING ORAL at 18:23

## 2022-11-14 RX ADMIN — ATORVASTATIN CALCIUM 10 MG: 10 TABLET, FILM COATED ORAL at 21:53

## 2022-11-14 RX ADMIN — RIZATRIPTAN BENZOATE 10 MG: 10 TABLET, ORALLY DISINTEGRATING ORAL at 21:54

## 2022-11-14 RX ADMIN — FENTANYL CITRATE 100 MCG: 50 INJECTION, SOLUTION INTRAMUSCULAR; INTRAVENOUS at 11:25

## 2022-11-14 RX ADMIN — BUPIVACAINE HYDROCHLORIDE 30 ML: 2.5 INJECTION, SOLUTION EPIDURAL; INFILTRATION; INTRACAUDAL at 11:25

## 2022-11-14 RX ADMIN — FENTANYL CITRATE 100 MCG: 50 INJECTION, SOLUTION INTRAMUSCULAR; INTRAVENOUS at 13:10

## 2022-11-14 RX ADMIN — LIDOCAINE HYDROCHLORIDE 40 MG: 10 INJECTION, SOLUTION EPIDURAL; INFILTRATION; INTRACAUDAL; PERINEURAL at 13:10

## 2022-11-14 RX ADMIN — TRANEXAMIC ACID 1000 MG: 10 INJECTION, SOLUTION INTRAVENOUS at 15:00

## 2022-11-14 RX ADMIN — DEXMEDETOMIDINE HYDROCHLORIDE 28 MCG: 100 INJECTION, SOLUTION INTRAVENOUS at 15:25

## 2022-11-14 RX ADMIN — FENTANYL CITRATE 50 MCG: 50 INJECTION, SOLUTION INTRAMUSCULAR; INTRAVENOUS at 16:07

## 2022-11-14 RX ADMIN — CEFAZOLIN SODIUM 2 G: 2 INJECTION, SOLUTION INTRAVENOUS at 13:03

## 2022-11-14 RX ADMIN — DEXAMETHASONE SODIUM PHOSPHATE 2 MG: 10 INJECTION, SOLUTION INTRAMUSCULAR; INTRAVENOUS at 11:25

## 2022-11-14 RX ADMIN — EPHEDRINE SULFATE 5 MG: 50 INJECTION INTRAVENOUS at 14:36

## 2022-11-14 RX ADMIN — PROPOFOL 200 MG: 10 INJECTION, EMULSION INTRAVENOUS at 13:10

## 2022-11-14 RX ADMIN — TRANEXAMIC ACID 1000 MG: 10 INJECTION, SOLUTION INTRAVENOUS at 13:28

## 2022-11-14 RX ADMIN — ESCITALOPRAM OXALATE 20 MG: 20 TABLET ORAL at 21:53

## 2022-11-14 RX ADMIN — FENTANYL CITRATE 50 MCG: 50 INJECTION, SOLUTION INTRAMUSCULAR; INTRAVENOUS at 16:20

## 2022-11-14 RX ADMIN — SODIUM CHLORIDE, POTASSIUM CHLORIDE, SODIUM LACTATE AND CALCIUM CHLORIDE 9 ML/HR: 600; 310; 30; 20 INJECTION, SOLUTION INTRAVENOUS at 10:14

## 2022-11-14 RX ADMIN — DEXMEDETOMIDINE HYDROCHLORIDE 12 MCG: 100 INJECTION, SOLUTION INTRAVENOUS at 14:52

## 2022-11-14 RX ADMIN — Medication 1000 MG: at 15:35

## 2022-11-14 RX ADMIN — CEFAZOLIN SODIUM 2 G: 2 INJECTION, SOLUTION INTRAVENOUS at 12:39

## 2022-11-14 RX ADMIN — SODIUM CHLORIDE, POTASSIUM CHLORIDE, SODIUM LACTATE AND CALCIUM CHLORIDE: 600; 310; 30; 20 INJECTION, SOLUTION INTRAVENOUS at 14:57

## 2022-11-14 RX ADMIN — MIDAZOLAM HYDROCHLORIDE 2 MG: 1 INJECTION, SOLUTION INTRAMUSCULAR; INTRAVENOUS at 11:25

## 2022-11-14 RX ADMIN — LIDOCAINE HYDROCHLORIDE 0.5 ML: 10 INJECTION, SOLUTION EPIDURAL; INFILTRATION; INTRACAUDAL; PERINEURAL at 10:14

## 2022-11-14 RX ADMIN — SUGAMMADEX 200 MG: 100 INJECTION, SOLUTION INTRAVENOUS at 15:22

## 2022-11-14 RX ADMIN — CETIRIZINE HYDROCHLORIDE 10 MG: 10 TABLET, FILM COATED ORAL at 18:23

## 2022-11-14 RX ADMIN — LOSARTAN POTASSIUM 25 MG: 25 TABLET, FILM COATED ORAL at 18:23

## 2022-11-14 RX ADMIN — BUPIVACAINE HYDROCHLORIDE 8 ML: 2.5 INJECTION, SOLUTION EPIDURAL; INFILTRATION; INTRACAUDAL at 12:00

## 2022-11-14 RX ADMIN — DEXAMETHASONE SODIUM PHOSPHATE 4 MG: 10 INJECTION, SOLUTION INTRAMUSCULAR; INTRAVENOUS at 13:27

## 2022-11-14 RX ADMIN — SODIUM CHLORIDE 50 ML/HR: 4.5 INJECTION, SOLUTION INTRAVENOUS at 18:24

## 2022-11-14 RX ADMIN — EPHEDRINE SULFATE 10 MG: 50 INJECTION INTRAVENOUS at 14:42

## 2022-11-14 RX ADMIN — ACETAMINOPHEN 1000 MG: 500 TABLET ORAL at 10:14

## 2022-11-14 RX ADMIN — ALBUTEROL SULFATE 2.5 MG: 2.5 SOLUTION RESPIRATORY (INHALATION) at 22:27

## 2022-11-14 RX ADMIN — ONDANSETRON 4 MG: 2 INJECTION INTRAMUSCULAR; INTRAVENOUS at 15:01

## 2022-11-14 RX ADMIN — Medication 1500 MG: at 13:13

## 2022-11-14 RX ADMIN — EPHEDRINE SULFATE 10 MG: 50 INJECTION INTRAVENOUS at 14:45

## 2022-11-14 RX ADMIN — ROCURONIUM BROMIDE 50 MG: 10 INJECTION INTRAVENOUS at 13:10

## 2022-11-14 NOTE — ANESTHESIA PROCEDURE NOTES
Airway  Urgency: elective    Date/Time: 11/14/2022 1:08 PM  Airway not difficult    General Information and Staff    Patient location during procedure: OR  CRNA/CAA: Lyn Zurita CRNA  SRNA: Sosa Wan SRNA  Indications and Patient Condition  Indications for airway management: airway protection    Preoxygenated: yes  MILS maintained throughout  Mask difficulty assessment: 2 - vent by mask + OA or adjuvant +/- NMBA    Final Airway Details  Final airway type: endotracheal airway      Successful airway: ETT  Cuffed: yes   Successful intubation technique: direct laryngoscopy  Facilitating devices/methods: intubating stylet  Endotracheal tube insertion site: oral  Blade: Land  Blade size: 2  ETT size (mm): 7.0  Cormack-Lehane Classification: grade I - full view of glottis  Placement verified by: chest auscultation and capnometry   Cuff volume (mL): 8  Measured from: lips  ETT/EBT  to lips (cm): 21  Number of attempts at approach: 1  Assessment: lips, teeth, and gum same as pre-op and atraumatic intubation    Additional Comments  Pt to OR 14.0Pt moved self to OR table. ASA monitors placed. Pre-O2 with 100% Oxygen. SIVI. Atraumatic intubation. +ETCO2, +BBS.

## 2022-11-14 NOTE — ANESTHESIA POSTPROCEDURE EVALUATION
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 [[id number]])    Surgeons: Carson Ashley MD Provider: Donnell Pepe MD    Anesthesia Type: general with block ASA Status: 3          Anesthesia Type: general with block    Vitals  Vitals Value Taken Time   /51 11/14/22 1536   Temp 97 °F (36.1 °C) 11/14/22 1536   Pulse 56 11/14/22 1536   Resp 17 11/14/22 1536   SpO2 97 % 11/14/22 1536           Post Anesthesia Care and Evaluation    Patient location during evaluation: PACU  Patient participation: complete - patient cannot participate  Level of consciousness: responsive to physical stimuli  Pain score: 0  Pain management: adequate    Airway patency: patent  Anesthetic complications: No anesthetic complications    Cardiovascular status: stable  Respiratory status: acceptable, nasal cannula, oral airway and spontaneous ventilation  Hydration status: stable    Comments: Pt transferred to PACU with O2. Vital signs stable. Report to PACU RN and care accepted.

## 2022-11-14 NOTE — ANESTHESIA PREPROCEDURE EVALUATION
Anesthesia Evaluation     Patient summary reviewed and Nursing notes reviewed   no history of anesthetic complications:  NPO Solid Status: > 8 hours  NPO Liquid Status: > 2 hours           Airway   Mallampati: II  TM distance: >3 FB  Neck ROM: full  Large neck circumference and Possible difficult intubation  Dental    (+) upper dentures and lower dentures    Pulmonary    (+) a smoker Former, lung cancer, asthma,sleep apnea, decreased breath sounds,   Cardiovascular - normal exam    ECG reviewed  Patient on routine beta blocker    (+) hypertension,   (-) dysrhythmias, angina, CHF, DVT    ROS comment: Dr Ag - preop stress/echo wnl, acceptable CV risk    Neuro/Psych  (+) headaches, psychiatric history PTSD and Anxiety,    GI/Hepatic/Renal/Endo    (+) obesity,  GERD,      Musculoskeletal     (+) chronic pain,   Abdominal    Substance History - negative use     OB/GYN          Other        ROS/Med Hx Other: hgb 14.5 k 4.9  08/2021 - [facility] - [id number]  06/2022--[id number]  Denies bleeding/clotting disorders; blood thinners                  Anesthesia Plan    ASA 3     general with block     (Risks and benefits of general anesthesia discussed with patient (including MI, CVA, death, recall, aspiration, oropharyngeal/dental damage), questions answered, agreeable to proceed.    Benefits and risks of nerve block/catheter discussed with patient ((including failed block, damage to nerve/nearby structures, intravascular injection)); questions answered, agreeable to proceed.  )  intravenous induction     Anesthetic plan, risks, benefits, and alternatives have been provided, discussed and informed consent has been obtained with: patient.    Use of blood products discussed with patient  Consented to blood products.   Plan discussed with CRNA.        CODE STATUS:

## 2022-11-14 NOTE — ANESTHESIA PROCEDURE NOTES
Suprascapular      Patient location during procedure: pre-op  Performed by  CRNA/CAA: Ingrid Donaldson CRNA  Assisted by: Tressa Costello RN  Preanesthetic Checklist  Completed: patient identified, IV checked, site marked, risks and benefits discussed, surgical consent, monitors and equipment checked, pre-op evaluation and timeout performed  Prep:  Pt Position: sitting  Sterile barriers:cap, gloves and sterile barriers  Prep: ChloraPrep  Patient monitoring: blood pressure monitoring, continuous pulse oximetry and EKG  Procedure    Sedation: yes  Performed under: local infiltration  Guidance:ultrasound guided    ULTRASOUND INTERPRETATION.  Using ultrasound guidance a 20 G gauge needle was placed in close proximity to the nerve, at which point, under ultrasound guidance anesthetic was injected in the area of the nerve and spread of the anesthesia was seen on ultrasound in close proximity thereto.  There were no abnormalities seen on ultrasound; a digital image was taken; and the patient tolerated the procedure with no complications. Images:still images obtained, printed/placed on chart    Laterality:left  Block Type:suprascapular  Injection Technique:single-shot  Needle Type:echogenic  Needle Gauge:20 G  Resistance on Injection: none    Medications Used: bupivacaine PF (MARCAINE) 0.25 % injection - Injection   30 mL - 11/14/2022 11:25:00 AM  dexamethasone sodium phosphate injection - Injection   2 mg - 11/14/2022 11:25:00 AM  fentaNYL citrate (PF) (SUBLIMAZE) injection - Intravenous   100 mcg - 11/14/2022 11:25:00 AM  midazolam (VERSED) injection - Intravenous   2 mg - 11/14/2022 11:25:00 AM      Post Assessment  Patient Tolerance:comfortable throughout block  Complications:no

## 2022-11-14 NOTE — ANESTHESIA PROCEDURE NOTES
Interscalene cath      Patient reassessed immediately prior to procedure    Patient location during procedure: pre-op  Reason for block: at surgeon's request and post-op pain management  Performed by  CRNA/CAA: Ingrid Donaldson CRNA  Assisted by: Tressa Costello RN  Preanesthetic Checklist  Completed: patient identified, IV checked, site marked, risks and benefits discussed, surgical consent, monitors and equipment checked, pre-op evaluation and timeout performed  Prep:  Sterile barriers:cap, gloves, mask and washed/disinfected hands  Prep: ChloraPrep  Patient monitoring: blood pressure monitoring, continuous pulse oximetry and EKG  Procedure    Sedation: yes  Performed under: local infiltration  Guidance:ultrasound guided    ULTRASOUND INTERPRETATION.  Using ultrasound guidance a 20 G gauge needle was placed in close proximity to the nerve, at which point, under ultrasound guidance anesthetic was injected in the area of the nerve and spread of the anesthesia was seen on ultrasound in close proximity thereto.  There were no abnormalities seen on ultrasound; a digital image was taken; and the patient tolerated the procedure with no complications. Images:still images obtained, printed/placed on chart    Laterality:left  Block Type:interscalene  Injection Technique:catheter  Needle Type:Tuohy and echogenic  Needle Gauge:18 G  Resistance on Injection: none  Catheter Size:20 G (20g)  Cath Depth at skin: 9 cm    Medications Used: bupivacaine PF (MARCAINE) 0.25 % injection - Injection   8 mL - 11/14/2022 12:00:00 PM      Post Assessment  Injection Assessment: negative aspiration for heme, no paresthesia on injection and incremental injection  Patient Tolerance:comfortable throughout block  Complications:no  Additional Notes  SINGLE shot   A high-frequency linear transducer, with sterile cover, was placed in the supraclavicular fossa to identify the subclavian artery and trunks and divisions of the brachial plexus. The  "transducer was then moved in a cephalad orientation with a slight rotation to continue visualization of the brachial plexus from the trunks and divisions, on to the C5-C7 roots. The insertion site was prepped and draped in sterile fashion. Skin and cutaneous tissue was infiltrated with 2-5 ml of 1% Lidocaine. Using ultrasound-guidance, a 20-gauge B-Crowder 4\" Ultraplex 360 non-stimulating echogenic needle was advanced in plane from lateral to medial. Preservative-free normal saline was utilized for hydro-dissection of tissue, and to confirm final needle placement at the fascial plane between the middle scalene muscle and sheath of the brachial plexus (C5-C7). Local anesthetic in incremental 3-5 ml injections. Aspiration every 5 ml to prevent intravascular injection. Injection was completed with negative aspiration of blood and negative intravascular injection. Injection pressures were normal with minimal resistance.    CATHETER  A high-frequency linear transducer, with sterile cover, was placed in the supraclavicular fossa to identify the subclavian artery and trunks and divisions of the brachial plexus. The transducer was then moved in a cephalad orientation with a slight rotation to continue visualization of the brachial plexus from the trunks and divisions, on to the C5-C7 roots. The insertion site was prepped and draped in sterile fashion. Skin and cutaneous tissue was infiltrated with 2-5 ml of 1% Lidocaine. Using ultrasound-guidance, an 18-gauge Contiplex Ultra 360 Touhy needle was advanced in plane from lateral to medial. Preservative-free normal saline was utilized for hydro-dissection of tissue, advancement of Touhy, and to confirm final needle placement at the fascial plane between the middle scalene muscle and sheath of the brachial plexus (C5-C7). A 20-gauge Contiplex Echo catheter was placed through the needle and advance out the tip of the Touhy 3-5 cm with the \"Petersen Flip\". The Touhy needle was then " "removed, and final catheter position verified lateral to the brachial plexus with local anesthetic (LA) and ultrasound visualization. The catheter was secured in the usual fashion with skin glue, benzoin, steri-strips, CHG tegaderm and label noting \"Nerve Block Catheter\". Jerk tape applied at yellow connector and catheter connection. All LA was injected in increments of 3-5 ml after catheter secured. Aspiration every 5 ml to prevent intravascular injection. Injection was completed with negative aspiration of blood and negative intravascular injection. Injection pressures were normal with minimal resistance.            "

## 2022-11-15 LAB
ANION GAP SERPL CALCULATED.3IONS-SCNC: 9 MMOL/L (ref 5–15)
BASOPHILS # BLD AUTO: 0.03 10*3/MM3 (ref 0–0.2)
BASOPHILS NFR BLD AUTO: 0.2 % (ref 0–1.5)
BUN SERPL-MCNC: 13 MG/DL (ref 8–23)
BUN/CREAT SERPL: 19.4 (ref 7–25)
CALCIUM SPEC-SCNC: 8.8 MG/DL (ref 8.6–10.5)
CHLORIDE SERPL-SCNC: 105 MMOL/L (ref 98–107)
CO2 SERPL-SCNC: 25 MMOL/L (ref 22–29)
CREAT SERPL-MCNC: 0.67 MG/DL (ref 0.57–1)
DEPRECATED RDW RBC AUTO: 48.5 FL (ref 37–54)
EGFRCR SERPLBLD CKD-EPI 2021: 96.5 ML/MIN/1.73
EOSINOPHIL # BLD AUTO: 0 10*3/MM3 (ref 0–0.4)
EOSINOPHIL NFR BLD AUTO: 0 % (ref 0.3–6.2)
ERYTHROCYTE [DISTWIDTH] IN BLOOD BY AUTOMATED COUNT: 14.3 % (ref 12.3–15.4)
GLUCOSE SERPL-MCNC: 135 MG/DL (ref 65–99)
HCT VFR BLD AUTO: 34.9 % (ref 34–46.6)
HGB BLD-MCNC: 11.5 G/DL (ref 12–15.9)
IMM GRANULOCYTES # BLD AUTO: 0.06 10*3/MM3 (ref 0–0.05)
IMM GRANULOCYTES NFR BLD AUTO: 0.4 % (ref 0–0.5)
LYMPHOCYTES # BLD AUTO: 1 10*3/MM3 (ref 0.7–3.1)
LYMPHOCYTES NFR BLD AUTO: 7.4 % (ref 19.6–45.3)
MCH RBC QN AUTO: 30.3 PG (ref 26.6–33)
MCHC RBC AUTO-ENTMCNC: 33 G/DL (ref 31.5–35.7)
MCV RBC AUTO: 91.8 FL (ref 79–97)
MONOCYTES # BLD AUTO: 1.14 10*3/MM3 (ref 0.1–0.9)
MONOCYTES NFR BLD AUTO: 8.4 % (ref 5–12)
NEUTROPHILS NFR BLD AUTO: 11.35 10*3/MM3 (ref 1.7–7)
NEUTROPHILS NFR BLD AUTO: 83.6 % (ref 42.7–76)
NRBC BLD AUTO-RTO: 0 /100 WBC (ref 0–0.2)
PLATELET # BLD AUTO: 244 10*3/MM3 (ref 140–450)
PMV BLD AUTO: 10.5 FL (ref 6–12)
POTASSIUM SERPL-SCNC: 4.1 MMOL/L (ref 3.5–5.2)
RBC # BLD AUTO: 3.8 10*6/MM3 (ref 3.77–5.28)
SODIUM SERPL-SCNC: 139 MMOL/L (ref 136–145)
WBC NRBC COR # BLD: 13.58 10*3/MM3 (ref 3.4–10.8)

## 2022-11-15 PROCEDURE — 94761 N-INVAS EAR/PLS OXIMETRY MLT: CPT

## 2022-11-15 PROCEDURE — 63710000001 ATORVASTATIN 10 MG TABLET: Performed by: INTERNAL MEDICINE

## 2022-11-15 PROCEDURE — 63710000001 OXYCODONE 5 MG TABLET: Performed by: ORTHOPAEDIC SURGERY

## 2022-11-15 PROCEDURE — 63710000001 QUETIAPINE 25 MG TABLET: Performed by: INTERNAL MEDICINE

## 2022-11-15 PROCEDURE — 97165 OT EVAL LOW COMPLEX 30 MIN: CPT | Performed by: OCCUPATIONAL THERAPIST

## 2022-11-15 PROCEDURE — 97530 THERAPEUTIC ACTIVITIES: CPT | Performed by: OCCUPATIONAL THERAPIST

## 2022-11-15 PROCEDURE — 63710000001 LOSARTAN 25 MG TABLET: Performed by: INTERNAL MEDICINE

## 2022-11-15 PROCEDURE — 63710000001 CETIRIZINE 10 MG TABLET: Performed by: INTERNAL MEDICINE

## 2022-11-15 PROCEDURE — 94664 DEMO&/EVAL PT USE INHALER: CPT

## 2022-11-15 PROCEDURE — 25010000002 VANCOMYCIN 10 G RECONSTITUTED SOLUTION: Performed by: ORTHOPAEDIC SURGERY

## 2022-11-15 PROCEDURE — 94799 UNLISTED PULMONARY SVC/PX: CPT

## 2022-11-15 PROCEDURE — 97161 PT EVAL LOW COMPLEX 20 MIN: CPT

## 2022-11-15 PROCEDURE — 97535 SELF CARE MNGMENT TRAINING: CPT | Performed by: OCCUPATIONAL THERAPIST

## 2022-11-15 PROCEDURE — A9270 NON-COVERED ITEM OR SERVICE: HCPCS | Performed by: ORTHOPAEDIC SURGERY

## 2022-11-15 PROCEDURE — 63710000001 ACETAMINOPHEN 325 MG TABLET: Performed by: ORTHOPAEDIC SURGERY

## 2022-11-15 PROCEDURE — 97110 THERAPEUTIC EXERCISES: CPT | Performed by: OCCUPATIONAL THERAPIST

## 2022-11-15 PROCEDURE — 80048 BASIC METABOLIC PNL TOTAL CA: CPT | Performed by: ORTHOPAEDIC SURGERY

## 2022-11-15 PROCEDURE — A9270 NON-COVERED ITEM OR SERVICE: HCPCS | Performed by: INTERNAL MEDICINE

## 2022-11-15 PROCEDURE — 85025 COMPLETE CBC W/AUTO DIFF WBC: CPT | Performed by: ORTHOPAEDIC SURGERY

## 2022-11-15 PROCEDURE — 63710000001 ESCITALOPRAM 20 MG TABLET: Performed by: INTERNAL MEDICINE

## 2022-11-15 RX ORDER — OXYCODONE HYDROCHLORIDE 5 MG/1
5 TABLET ORAL EVERY 4 HOURS PRN
Qty: 25 TABLET | Refills: 0 | Status: SHIPPED | OUTPATIENT
Start: 2022-11-15 | End: 2022-11-21 | Stop reason: SDUPTHER

## 2022-11-15 RX ORDER — ROPIVACAINE HYDROCHLORIDE 2 MG/ML
1 INJECTION, SOLUTION EPIDURAL; INFILTRATION; PERINEURAL CONTINUOUS
Start: 2022-11-15 | End: 2022-11-21 | Stop reason: HOSPADM

## 2022-11-15 RX ORDER — ROPIVACAINE HYDROCHLORIDE 2 MG/ML
INJECTION, SOLUTION EPIDURAL; INFILTRATION; PERINEURAL CONTINUOUS
Status: DISCONTINUED | OUTPATIENT
Start: 2022-11-15 | End: 2022-11-21

## 2022-11-15 RX ORDER — IPRATROPIUM BROMIDE AND ALBUTEROL SULFATE 2.5; .5 MG/3ML; MG/3ML
3 SOLUTION RESPIRATORY (INHALATION)
Status: DISCONTINUED | OUTPATIENT
Start: 2022-11-15 | End: 2022-11-21 | Stop reason: HOSPADM

## 2022-11-15 RX ORDER — ACETAMINOPHEN 325 MG/1
650 TABLET ORAL EVERY 4 HOURS PRN
Start: 2022-11-15 | End: 2023-03-31

## 2022-11-15 RX ORDER — POLYETHYLENE GLYCOL 3350 17 G/17G
17 POWDER, FOR SOLUTION ORAL DAILY
Qty: 238 G | Refills: 0 | Status: SHIPPED | OUTPATIENT
Start: 2022-11-15 | End: 2022-11-21 | Stop reason: SDUPTHER

## 2022-11-15 RX ADMIN — ACETAMINOPHEN 650 MG: 325 TABLET, FILM COATED ORAL at 20:53

## 2022-11-15 RX ADMIN — VANCOMYCIN HYDROCHLORIDE 1500 MG: 10 INJECTION, POWDER, LYOPHILIZED, FOR SOLUTION INTRAVENOUS at 00:44

## 2022-11-15 RX ADMIN — IPRATROPIUM BROMIDE AND ALBUTEROL SULFATE 3 ML: .5; 3 SOLUTION RESPIRATORY (INHALATION) at 16:47

## 2022-11-15 RX ADMIN — OXYCODONE 5 MG: 5 TABLET ORAL at 08:18

## 2022-11-15 RX ADMIN — ACETAMINOPHEN 650 MG: 325 TABLET, FILM COATED ORAL at 00:44

## 2022-11-15 RX ADMIN — CETIRIZINE HYDROCHLORIDE 10 MG: 10 TABLET, FILM COATED ORAL at 08:18

## 2022-11-15 RX ADMIN — ALBUTEROL SULFATE 2.5 MG: 2.5 SOLUTION RESPIRATORY (INHALATION) at 08:28

## 2022-11-15 RX ADMIN — ATORVASTATIN CALCIUM 10 MG: 10 TABLET, FILM COATED ORAL at 20:53

## 2022-11-15 RX ADMIN — LOSARTAN POTASSIUM 25 MG: 25 TABLET, FILM COATED ORAL at 08:18

## 2022-11-15 RX ADMIN — OXYCODONE 5 MG: 5 TABLET ORAL at 12:43

## 2022-11-15 RX ADMIN — ESCITALOPRAM OXALATE 20 MG: 20 TABLET ORAL at 20:53

## 2022-11-15 RX ADMIN — OXYCODONE 5 MG: 5 TABLET ORAL at 16:57

## 2022-11-15 RX ADMIN — OXYCODONE 5 MG: 5 TABLET ORAL at 20:53

## 2022-11-15 RX ADMIN — QUETIAPINE FUMARATE 75 MG: 25 TABLET ORAL at 20:53

## 2022-11-15 RX ADMIN — IPRATROPIUM BROMIDE AND ALBUTEROL SULFATE 3 ML: .5; 3 SOLUTION RESPIRATORY (INHALATION) at 20:03

## 2022-11-15 RX ADMIN — OXYCODONE 5 MG: 5 TABLET ORAL at 00:39

## 2022-11-16 ENCOUNTER — APPOINTMENT (OUTPATIENT)
Dept: GENERAL RADIOLOGY | Facility: HOSPITAL | Age: 67
End: 2022-11-16

## 2022-11-16 PROCEDURE — 94799 UNLISTED PULMONARY SVC/PX: CPT

## 2022-11-16 PROCEDURE — 71045 X-RAY EXAM CHEST 1 VIEW: CPT

## 2022-11-16 PROCEDURE — 25010000002 METHYLPREDNISOLONE PER 40 MG: Performed by: NURSE PRACTITIONER

## 2022-11-16 PROCEDURE — 63710000001 QUETIAPINE 25 MG TABLET: Performed by: INTERNAL MEDICINE

## 2022-11-16 PROCEDURE — 63710000001 ESCITALOPRAM 20 MG TABLET: Performed by: INTERNAL MEDICINE

## 2022-11-16 PROCEDURE — 97110 THERAPEUTIC EXERCISES: CPT | Performed by: OCCUPATIONAL THERAPIST

## 2022-11-16 PROCEDURE — A9270 NON-COVERED ITEM OR SERVICE: HCPCS | Performed by: INTERNAL MEDICINE

## 2022-11-16 PROCEDURE — 63710000001 GUAIFENESIN 600 MG TABLET SUSTAINED-RELEASE 12 HOUR: Performed by: INTERNAL MEDICINE

## 2022-11-16 PROCEDURE — 97116 GAIT TRAINING THERAPY: CPT

## 2022-11-16 PROCEDURE — 63710000001 ACETAMINOPHEN 325 MG TABLET: Performed by: ORTHOPAEDIC SURGERY

## 2022-11-16 PROCEDURE — A9270 NON-COVERED ITEM OR SERVICE: HCPCS | Performed by: NURSE PRACTITIONER

## 2022-11-16 PROCEDURE — 97110 THERAPEUTIC EXERCISES: CPT

## 2022-11-16 PROCEDURE — 63710000001 OXYCODONE 5 MG TABLET: Performed by: ORTHOPAEDIC SURGERY

## 2022-11-16 PROCEDURE — 63710000001 VENLAFAXINE XR 75 MG CAPSULE SUSTAINED-RELEASE 24 HR: Performed by: NURSE PRACTITIONER

## 2022-11-16 PROCEDURE — 63710000001 LOSARTAN 25 MG TABLET: Performed by: INTERNAL MEDICINE

## 2022-11-16 PROCEDURE — 25010000002 HYDROMORPHONE PER 4 MG: Performed by: ORTHOPAEDIC SURGERY

## 2022-11-16 PROCEDURE — 63710000001 CETIRIZINE 10 MG TABLET: Performed by: INTERNAL MEDICINE

## 2022-11-16 PROCEDURE — 63710000001 OXYCODONE 5 MG TABLET: Performed by: INTERNAL MEDICINE

## 2022-11-16 PROCEDURE — 63710000001 ATORVASTATIN 10 MG TABLET: Performed by: INTERNAL MEDICINE

## 2022-11-16 PROCEDURE — 63710000001 ACETAMINOPHEN 500 MG TABLET: Performed by: INTERNAL MEDICINE

## 2022-11-16 PROCEDURE — 94761 N-INVAS EAR/PLS OXIMETRY MLT: CPT

## 2022-11-16 PROCEDURE — A9270 NON-COVERED ITEM OR SERVICE: HCPCS | Performed by: ORTHOPAEDIC SURGERY

## 2022-11-16 PROCEDURE — 97535 SELF CARE MNGMENT TRAINING: CPT | Performed by: OCCUPATIONAL THERAPIST

## 2022-11-16 RX ORDER — GUAIFENESIN 600 MG/1
1200 TABLET, EXTENDED RELEASE ORAL 2 TIMES DAILY
Status: DISCONTINUED | OUTPATIENT
Start: 2022-11-16 | End: 2022-11-17

## 2022-11-16 RX ORDER — VENLAFAXINE HYDROCHLORIDE 75 MG/1
225 CAPSULE, EXTENDED RELEASE ORAL
Status: DISCONTINUED | OUTPATIENT
Start: 2022-11-16 | End: 2022-11-21 | Stop reason: HOSPADM

## 2022-11-16 RX ORDER — OXYCODONE HYDROCHLORIDE 5 MG/1
10 TABLET ORAL EVERY 4 HOURS PRN
Status: DISCONTINUED | OUTPATIENT
Start: 2022-11-16 | End: 2022-11-21 | Stop reason: HOSPADM

## 2022-11-16 RX ORDER — OXYCODONE HYDROCHLORIDE 5 MG/1
5 TABLET ORAL EVERY 4 HOURS PRN
Status: DISCONTINUED | OUTPATIENT
Start: 2022-11-16 | End: 2022-11-21 | Stop reason: HOSPADM

## 2022-11-16 RX ORDER — ACETAMINOPHEN 500 MG
1000 TABLET ORAL EVERY 8 HOURS SCHEDULED
Status: DISCONTINUED | OUTPATIENT
Start: 2022-11-16 | End: 2022-11-21 | Stop reason: HOSPADM

## 2022-11-16 RX ORDER — METHYLPREDNISOLONE SODIUM SUCCINATE 40 MG/ML
40 INJECTION, POWDER, LYOPHILIZED, FOR SOLUTION INTRAMUSCULAR; INTRAVENOUS EVERY 6 HOURS
Status: COMPLETED | OUTPATIENT
Start: 2022-11-16 | End: 2022-11-16

## 2022-11-16 RX ADMIN — OXYCODONE 5 MG: 5 TABLET ORAL at 23:16

## 2022-11-16 RX ADMIN — ESCITALOPRAM OXALATE 20 MG: 20 TABLET ORAL at 20:06

## 2022-11-16 RX ADMIN — ALBUTEROL SULFATE 2.5 MG: 2.5 SOLUTION RESPIRATORY (INHALATION) at 03:51

## 2022-11-16 RX ADMIN — IPRATROPIUM BROMIDE AND ALBUTEROL SULFATE 3 ML: .5; 3 SOLUTION RESPIRATORY (INHALATION) at 12:23

## 2022-11-16 RX ADMIN — ACETAMINOPHEN 1000 MG: 500 TABLET ORAL at 10:24

## 2022-11-16 RX ADMIN — OXYCODONE 10 MG: 5 TABLET ORAL at 13:51

## 2022-11-16 RX ADMIN — VENLAFAXINE HYDROCHLORIDE 225 MG: 75 CAPSULE, EXTENDED RELEASE ORAL at 15:33

## 2022-11-16 RX ADMIN — LOSARTAN POTASSIUM 25 MG: 25 TABLET, FILM COATED ORAL at 08:14

## 2022-11-16 RX ADMIN — OXYCODONE 5 MG: 5 TABLET ORAL at 23:07

## 2022-11-16 RX ADMIN — METHYLPREDNISOLONE SODIUM SUCCINATE 40 MG: 40 INJECTION, POWDER, FOR SOLUTION INTRAMUSCULAR; INTRAVENOUS at 17:13

## 2022-11-16 RX ADMIN — ACETAMINOPHEN 650 MG: 325 TABLET, FILM COATED ORAL at 03:39

## 2022-11-16 RX ADMIN — OXYCODONE 5 MG: 5 TABLET ORAL at 03:39

## 2022-11-16 RX ADMIN — HYDROMORPHONE HYDROCHLORIDE 0.5 MG: 1 INJECTION, SOLUTION INTRAMUSCULAR; INTRAVENOUS; SUBCUTANEOUS at 11:31

## 2022-11-16 RX ADMIN — GUAIFENESIN 1200 MG: 600 TABLET, EXTENDED RELEASE ORAL at 20:06

## 2022-11-16 RX ADMIN — GUAIFENESIN 1200 MG: 600 TABLET, EXTENDED RELEASE ORAL at 08:47

## 2022-11-16 RX ADMIN — METHYLPREDNISOLONE SODIUM SUCCINATE 40 MG: 40 INJECTION, POWDER, FOR SOLUTION INTRAMUSCULAR; INTRAVENOUS at 11:32

## 2022-11-16 RX ADMIN — QUETIAPINE FUMARATE 75 MG: 25 TABLET ORAL at 20:06

## 2022-11-16 RX ADMIN — CETIRIZINE HYDROCHLORIDE 10 MG: 10 TABLET, FILM COATED ORAL at 08:14

## 2022-11-16 RX ADMIN — OXYCODONE 5 MG: 5 TABLET ORAL at 08:47

## 2022-11-16 RX ADMIN — IPRATROPIUM BROMIDE AND ALBUTEROL SULFATE 3 ML: .5; 3 SOLUTION RESPIRATORY (INHALATION) at 08:03

## 2022-11-16 RX ADMIN — IPRATROPIUM BROMIDE AND ALBUTEROL SULFATE 3 ML: .5; 3 SOLUTION RESPIRATORY (INHALATION) at 18:38

## 2022-11-16 RX ADMIN — ACETAMINOPHEN 1000 MG: 500 TABLET ORAL at 20:05

## 2022-11-16 RX ADMIN — ATORVASTATIN CALCIUM 10 MG: 10 TABLET, FILM COATED ORAL at 20:06

## 2022-11-16 RX ADMIN — OXYCODONE 10 MG: 5 TABLET ORAL at 18:18

## 2022-11-16 RX ADMIN — METHYLPREDNISOLONE SODIUM SUCCINATE 40 MG: 40 INJECTION, POWDER, FOR SOLUTION INTRAMUSCULAR; INTRAVENOUS at 23:07

## 2022-11-16 RX ADMIN — IPRATROPIUM BROMIDE AND ALBUTEROL SULFATE 3 ML: .5; 3 SOLUTION RESPIRATORY (INHALATION) at 15:26

## 2022-11-17 ENCOUNTER — APPOINTMENT (OUTPATIENT)
Dept: GENERAL RADIOLOGY | Facility: HOSPITAL | Age: 67
End: 2022-11-17

## 2022-11-17 LAB
ANION GAP SERPL CALCULATED.3IONS-SCNC: 9 MMOL/L (ref 5–15)
BUN SERPL-MCNC: 17 MG/DL (ref 8–23)
BUN/CREAT SERPL: 23.6 (ref 7–25)
CALCIUM SPEC-SCNC: 9.6 MG/DL (ref 8.6–10.5)
CHLORIDE SERPL-SCNC: 102 MMOL/L (ref 98–107)
CO2 SERPL-SCNC: 29 MMOL/L (ref 22–29)
CREAT SERPL-MCNC: 0.72 MG/DL (ref 0.57–1)
D-LACTATE SERPL-SCNC: 1.6 MMOL/L (ref 0.5–2)
DEPRECATED RDW RBC AUTO: 48.4 FL (ref 37–54)
EGFRCR SERPLBLD CKD-EPI 2021: 92.3 ML/MIN/1.73
ERYTHROCYTE [DISTWIDTH] IN BLOOD BY AUTOMATED COUNT: 14.4 % (ref 12.3–15.4)
GLUCOSE SERPL-MCNC: 130 MG/DL (ref 65–99)
HCT VFR BLD AUTO: 35.8 % (ref 34–46.6)
HGB BLD-MCNC: 11.8 G/DL (ref 12–15.9)
MCH RBC QN AUTO: 30 PG (ref 26.6–33)
MCHC RBC AUTO-ENTMCNC: 33 G/DL (ref 31.5–35.7)
MCV RBC AUTO: 91.1 FL (ref 79–97)
MRSA DNA SPEC QL NAA+PROBE: NEGATIVE
NT-PROBNP SERPL-MCNC: 396.2 PG/ML (ref 0–900)
PLATELET # BLD AUTO: 269 10*3/MM3 (ref 140–450)
PMV BLD AUTO: 9.9 FL (ref 6–12)
POTASSIUM SERPL-SCNC: 4.5 MMOL/L (ref 3.5–5.2)
RBC # BLD AUTO: 3.93 10*6/MM3 (ref 3.77–5.28)
SODIUM SERPL-SCNC: 140 MMOL/L (ref 136–145)
WBC NRBC COR # BLD: 17.14 10*3/MM3 (ref 3.4–10.8)

## 2022-11-17 PROCEDURE — 87040 BLOOD CULTURE FOR BACTERIA: CPT | Performed by: INTERNAL MEDICINE

## 2022-11-17 PROCEDURE — 25010000002 PIPERACILLIN SOD-TAZOBACTAM PER 1 G: Performed by: INTERNAL MEDICINE

## 2022-11-17 PROCEDURE — 63710000001 ESCITALOPRAM 20 MG TABLET: Performed by: INTERNAL MEDICINE

## 2022-11-17 PROCEDURE — 63710000001 QUETIAPINE 25 MG TABLET: Performed by: INTERNAL MEDICINE

## 2022-11-17 PROCEDURE — 83605 ASSAY OF LACTIC ACID: CPT | Performed by: INTERNAL MEDICINE

## 2022-11-17 PROCEDURE — A9270 NON-COVERED ITEM OR SERVICE: HCPCS | Performed by: INTERNAL MEDICINE

## 2022-11-17 PROCEDURE — 63710000001 GUAIFENESIN-DEXTROMETHORPHAN 30-600 MG TABLET SUSTAINED-RELEASE 12 HOUR: Performed by: INTERNAL MEDICINE

## 2022-11-17 PROCEDURE — 71046 X-RAY EXAM CHEST 2 VIEWS: CPT

## 2022-11-17 PROCEDURE — 63710000001 GUAIFENESIN 600 MG TABLET SUSTAINED-RELEASE 12 HOUR: Performed by: INTERNAL MEDICINE

## 2022-11-17 PROCEDURE — 63710000001 ACETAMINOPHEN 500 MG TABLET: Performed by: INTERNAL MEDICINE

## 2022-11-17 PROCEDURE — 63710000001 VENLAFAXINE XR 75 MG CAPSULE SUSTAINED-RELEASE 24 HR: Performed by: NURSE PRACTITIONER

## 2022-11-17 PROCEDURE — 80048 BASIC METABOLIC PNL TOTAL CA: CPT | Performed by: INTERNAL MEDICINE

## 2022-11-17 PROCEDURE — 97535 SELF CARE MNGMENT TRAINING: CPT | Performed by: OCCUPATIONAL THERAPIST

## 2022-11-17 PROCEDURE — 94799 UNLISTED PULMONARY SVC/PX: CPT

## 2022-11-17 PROCEDURE — 87641 MR-STAPH DNA AMP PROBE: CPT | Performed by: INTERNAL MEDICINE

## 2022-11-17 PROCEDURE — 97110 THERAPEUTIC EXERCISES: CPT | Performed by: OCCUPATIONAL THERAPIST

## 2022-11-17 PROCEDURE — 63710000001 PREDNISONE PER 1 MG: Performed by: INTERNAL MEDICINE

## 2022-11-17 PROCEDURE — 97530 THERAPEUTIC ACTIVITIES: CPT

## 2022-11-17 PROCEDURE — 83880 ASSAY OF NATRIURETIC PEPTIDE: CPT | Performed by: INTERNAL MEDICINE

## 2022-11-17 PROCEDURE — 63710000001 LOSARTAN 25 MG TABLET: Performed by: INTERNAL MEDICINE

## 2022-11-17 PROCEDURE — 25010000002 VANCOMYCIN 10 G RECONSTITUTED SOLUTION

## 2022-11-17 PROCEDURE — 63710000001 OXYCODONE 5 MG TABLET: Performed by: INTERNAL MEDICINE

## 2022-11-17 PROCEDURE — 63710000001 ATORVASTATIN 10 MG TABLET: Performed by: INTERNAL MEDICINE

## 2022-11-17 PROCEDURE — 94761 N-INVAS EAR/PLS OXIMETRY MLT: CPT

## 2022-11-17 PROCEDURE — A9270 NON-COVERED ITEM OR SERVICE: HCPCS | Performed by: NURSE PRACTITIONER

## 2022-11-17 PROCEDURE — 85027 COMPLETE CBC AUTOMATED: CPT | Performed by: INTERNAL MEDICINE

## 2022-11-17 PROCEDURE — 63710000001 CETIRIZINE 10 MG TABLET: Performed by: INTERNAL MEDICINE

## 2022-11-17 PROCEDURE — 25010000002 HYDROMORPHONE PER 4 MG: Performed by: ORTHOPAEDIC SURGERY

## 2022-11-17 PROCEDURE — 94664 DEMO&/EVAL PT USE INHALER: CPT

## 2022-11-17 RX ORDER — PREDNISONE 20 MG/1
40 TABLET ORAL
Status: DISCONTINUED | OUTPATIENT
Start: 2022-11-17 | End: 2022-11-18

## 2022-11-17 RX ORDER — VANCOMYCIN HYDROCHLORIDE 1 G/200ML
1000 INJECTION, SOLUTION INTRAVENOUS EVERY 12 HOURS
Status: DISCONTINUED | OUTPATIENT
Start: 2022-11-18 | End: 2022-11-18

## 2022-11-17 RX ORDER — GUAIFENESIN AND DEXTROMETHORPHAN HYDROBROMIDE 600; 30 MG/1; MG/1
2 TABLET, EXTENDED RELEASE ORAL 2 TIMES DAILY
Status: DISCONTINUED | OUTPATIENT
Start: 2022-11-17 | End: 2022-11-21 | Stop reason: HOSPADM

## 2022-11-17 RX ADMIN — HYDROMORPHONE HYDROCHLORIDE 0.5 MG: 1 INJECTION, SOLUTION INTRAMUSCULAR; INTRAVENOUS; SUBCUTANEOUS at 13:29

## 2022-11-17 RX ADMIN — IPRATROPIUM BROMIDE AND ALBUTEROL SULFATE 3 ML: .5; 3 SOLUTION RESPIRATORY (INHALATION) at 20:08

## 2022-11-17 RX ADMIN — ACETAMINOPHEN 1000 MG: 500 TABLET ORAL at 06:46

## 2022-11-17 RX ADMIN — LOSARTAN POTASSIUM 25 MG: 25 TABLET, FILM COATED ORAL at 08:38

## 2022-11-17 RX ADMIN — VENLAFAXINE HYDROCHLORIDE 225 MG: 75 CAPSULE, EXTENDED RELEASE ORAL at 08:38

## 2022-11-17 RX ADMIN — GUAIFENESIN 1200 MG: 600 TABLET, EXTENDED RELEASE ORAL at 08:38

## 2022-11-17 RX ADMIN — IPRATROPIUM BROMIDE AND ALBUTEROL SULFATE 3 ML: .5; 3 SOLUTION RESPIRATORY (INHALATION) at 15:41

## 2022-11-17 RX ADMIN — PREDNISONE 40 MG: 20 TABLET ORAL at 10:51

## 2022-11-17 RX ADMIN — OXYCODONE 10 MG: 5 TABLET ORAL at 06:46

## 2022-11-17 RX ADMIN — TAZOBACTAM SODIUM AND PIPERACILLIN SODIUM 3.38 G: 375; 3 INJECTION, SOLUTION INTRAVENOUS at 17:38

## 2022-11-17 RX ADMIN — GUAIFENESIN AND DEXTROMETHORPHAN HYDROBROMIDE 2 TABLET: 600; 30 TABLET, EXTENDED RELEASE ORAL at 20:57

## 2022-11-17 RX ADMIN — ACETAMINOPHEN 1000 MG: 500 TABLET ORAL at 20:57

## 2022-11-17 RX ADMIN — ESCITALOPRAM OXALATE 20 MG: 20 TABLET ORAL at 20:57

## 2022-11-17 RX ADMIN — ACETAMINOPHEN 1000 MG: 500 TABLET ORAL at 13:29

## 2022-11-17 RX ADMIN — OXYCODONE 10 MG: 5 TABLET ORAL at 03:01

## 2022-11-17 RX ADMIN — VANCOMYCIN HYDROCHLORIDE 2000 MG: 10 INJECTION, POWDER, LYOPHILIZED, FOR SOLUTION INTRAVENOUS at 13:27

## 2022-11-17 RX ADMIN — OXYCODONE 10 MG: 5 TABLET ORAL at 16:28

## 2022-11-17 RX ADMIN — IPRATROPIUM BROMIDE AND ALBUTEROL SULFATE 3 ML: .5; 3 SOLUTION RESPIRATORY (INHALATION) at 07:22

## 2022-11-17 RX ADMIN — TAZOBACTAM SODIUM AND PIPERACILLIN SODIUM 3.38 G: 375; 3 INJECTION, SOLUTION INTRAVENOUS at 11:55

## 2022-11-17 RX ADMIN — CETIRIZINE HYDROCHLORIDE 10 MG: 10 TABLET, FILM COATED ORAL at 08:38

## 2022-11-17 RX ADMIN — IPRATROPIUM BROMIDE AND ALBUTEROL SULFATE 3 ML: .5; 3 SOLUTION RESPIRATORY (INHALATION) at 12:44

## 2022-11-17 RX ADMIN — QUETIAPINE FUMARATE 75 MG: 25 TABLET ORAL at 20:57

## 2022-11-17 RX ADMIN — HYDROMORPHONE HYDROCHLORIDE 0.5 MG: 1 INJECTION, SOLUTION INTRAMUSCULAR; INTRAVENOUS; SUBCUTANEOUS at 19:44

## 2022-11-17 RX ADMIN — OXYCODONE 10 MG: 5 TABLET ORAL at 10:52

## 2022-11-17 RX ADMIN — ATORVASTATIN CALCIUM 10 MG: 10 TABLET, FILM COATED ORAL at 20:57

## 2022-11-18 ENCOUNTER — APPOINTMENT (OUTPATIENT)
Dept: GENERAL RADIOLOGY | Facility: HOSPITAL | Age: 67
End: 2022-11-18

## 2022-11-18 PROBLEM — J45.909 ASTHMA: Status: ACTIVE | Noted: 2022-11-18

## 2022-11-18 PROBLEM — R06.02 SHORTNESS OF BREATH: Status: ACTIVE | Noted: 2022-11-18

## 2022-11-18 LAB
B PARAPERT DNA SPEC QL NAA+PROBE: NOT DETECTED
B PERT DNA SPEC QL NAA+PROBE: NOT DETECTED
C PNEUM DNA NPH QL NAA+NON-PROBE: NOT DETECTED
FLUAV SUBTYP SPEC NAA+PROBE: NOT DETECTED
FLUBV RNA ISLT QL NAA+PROBE: NOT DETECTED
HADV DNA SPEC NAA+PROBE: NOT DETECTED
HCOV 229E RNA SPEC QL NAA+PROBE: NOT DETECTED
HCOV HKU1 RNA SPEC QL NAA+PROBE: NOT DETECTED
HCOV NL63 RNA SPEC QL NAA+PROBE: NOT DETECTED
HCOV OC43 RNA SPEC QL NAA+PROBE: NOT DETECTED
HMPV RNA NPH QL NAA+NON-PROBE: NOT DETECTED
HPIV1 RNA ISLT QL NAA+PROBE: NOT DETECTED
HPIV2 RNA SPEC QL NAA+PROBE: NOT DETECTED
HPIV3 RNA NPH QL NAA+PROBE: NOT DETECTED
HPIV4 P GENE NPH QL NAA+PROBE: NOT DETECTED
M PNEUMO IGG SER IA-ACNC: NOT DETECTED
PROCALCITONIN SERPL-MCNC: 0.08 NG/ML (ref 0–0.25)
RHINOVIRUS RNA SPEC NAA+PROBE: NOT DETECTED
RSV RNA NPH QL NAA+NON-PROBE: NOT DETECTED
SARS-COV-2 RNA NPH QL NAA+NON-PROBE: NOT DETECTED
VANCOMYCIN SERPL-MCNC: 21.2 MCG/ML (ref 5–40)

## 2022-11-18 PROCEDURE — 0202U NFCT DS 22 TRGT SARS-COV-2: CPT | Performed by: INTERNAL MEDICINE

## 2022-11-18 PROCEDURE — 94799 UNLISTED PULMONARY SVC/PX: CPT

## 2022-11-18 PROCEDURE — A9270 NON-COVERED ITEM OR SERVICE: HCPCS | Performed by: INTERNAL MEDICINE

## 2022-11-18 PROCEDURE — 63710000001 ESCITALOPRAM 20 MG TABLET: Performed by: INTERNAL MEDICINE

## 2022-11-18 PROCEDURE — 80202 ASSAY OF VANCOMYCIN: CPT

## 2022-11-18 PROCEDURE — 97110 THERAPEUTIC EXERCISES: CPT

## 2022-11-18 PROCEDURE — 97535 SELF CARE MNGMENT TRAINING: CPT

## 2022-11-18 PROCEDURE — 63710000001 GUAIFENESIN-DEXTROMETHORPHAN 30-600 MG TABLET SUSTAINED-RELEASE 12 HOUR: Performed by: INTERNAL MEDICINE

## 2022-11-18 PROCEDURE — 25010000002 PIPERACILLIN SOD-TAZOBACTAM PER 1 G: Performed by: INTERNAL MEDICINE

## 2022-11-18 PROCEDURE — 63710000001 ATORVASTATIN 10 MG TABLET: Performed by: INTERNAL MEDICINE

## 2022-11-18 PROCEDURE — 25010000002 VANCOMYCIN PER 500 MG

## 2022-11-18 PROCEDURE — 63710000001 OXYCODONE 5 MG TABLET: Performed by: INTERNAL MEDICINE

## 2022-11-18 PROCEDURE — 99204 OFFICE O/P NEW MOD 45 MIN: CPT | Performed by: INTERNAL MEDICINE

## 2022-11-18 PROCEDURE — 25010000002 DEXAMETHASONE PER 1 MG: Performed by: INTERNAL MEDICINE

## 2022-11-18 PROCEDURE — 63710000001 ACETAMINOPHEN 500 MG TABLET: Performed by: INTERNAL MEDICINE

## 2022-11-18 PROCEDURE — 71045 X-RAY EXAM CHEST 1 VIEW: CPT

## 2022-11-18 PROCEDURE — 63710000001 LOSARTAN 25 MG TABLET: Performed by: INTERNAL MEDICINE

## 2022-11-18 PROCEDURE — 63710000001 HYDROCOD POLST-CPM POLST ER 10-8 MG/5ML SUSPENSION EXTENDED RELEASE: Performed by: INTERNAL MEDICINE

## 2022-11-18 PROCEDURE — 63710000001 PREDNISONE PER 1 MG: Performed by: INTERNAL MEDICINE

## 2022-11-18 PROCEDURE — 63710000001 CETIRIZINE 10 MG TABLET: Performed by: INTERNAL MEDICINE

## 2022-11-18 PROCEDURE — 63710000001 VENLAFAXINE XR 75 MG CAPSULE SUSTAINED-RELEASE 24 HR: Performed by: NURSE PRACTITIONER

## 2022-11-18 PROCEDURE — 84145 PROCALCITONIN (PCT): CPT | Performed by: INTERNAL MEDICINE

## 2022-11-18 PROCEDURE — A9270 NON-COVERED ITEM OR SERVICE: HCPCS | Performed by: NURSE PRACTITIONER

## 2022-11-18 PROCEDURE — 63710000001 QUETIAPINE 25 MG TABLET: Performed by: INTERNAL MEDICINE

## 2022-11-18 RX ORDER — DEXAMETHASONE SODIUM PHOSPHATE 4 MG/ML
4 INJECTION, SOLUTION INTRA-ARTICULAR; INTRALESIONAL; INTRAMUSCULAR; INTRAVENOUS; SOFT TISSUE EVERY 8 HOURS
Status: DISCONTINUED | OUTPATIENT
Start: 2022-11-18 | End: 2022-11-19

## 2022-11-18 RX ADMIN — DEXAMETHASONE SODIUM PHOSPHATE 4 MG: 4 INJECTION INTRA-ARTICULAR; INTRALESIONAL; INTRAMUSCULAR; INTRAVENOUS; SOFT TISSUE at 20:24

## 2022-11-18 RX ADMIN — HYDROCODONE POLISTIREX AND CHLORPHENIRAMINE POLISTIREX 5 ML: 10; 8 SUSPENSION, EXTENDED RELEASE ORAL at 14:40

## 2022-11-18 RX ADMIN — ACETAMINOPHEN 1000 MG: 500 TABLET ORAL at 05:13

## 2022-11-18 RX ADMIN — DOXYCYCLINE 100 MG: 100 INJECTION, POWDER, LYOPHILIZED, FOR SOLUTION INTRAVENOUS at 16:06

## 2022-11-18 RX ADMIN — CETIRIZINE HYDROCHLORIDE 10 MG: 10 TABLET, FILM COATED ORAL at 10:26

## 2022-11-18 RX ADMIN — ALBUTEROL SULFATE 2.5 MG: 2.5 SOLUTION RESPIRATORY (INHALATION) at 23:42

## 2022-11-18 RX ADMIN — OXYCODONE 10 MG: 5 TABLET ORAL at 05:13

## 2022-11-18 RX ADMIN — PREDNISONE 40 MG: 20 TABLET ORAL at 10:25

## 2022-11-18 RX ADMIN — IPRATROPIUM BROMIDE AND ALBUTEROL SULFATE 3 ML: .5; 3 SOLUTION RESPIRATORY (INHALATION) at 07:01

## 2022-11-18 RX ADMIN — IPRATROPIUM BROMIDE AND ALBUTEROL SULFATE 3 ML: .5; 3 SOLUTION RESPIRATORY (INHALATION) at 19:02

## 2022-11-18 RX ADMIN — IPRATROPIUM BROMIDE AND ALBUTEROL SULFATE 3 ML: .5; 3 SOLUTION RESPIRATORY (INHALATION) at 11:50

## 2022-11-18 RX ADMIN — OXYCODONE 10 MG: 5 TABLET ORAL at 10:27

## 2022-11-18 RX ADMIN — ACETAMINOPHEN 1000 MG: 500 TABLET ORAL at 13:55

## 2022-11-18 RX ADMIN — IPRATROPIUM BROMIDE AND ALBUTEROL SULFATE 3 ML: .5; 3 SOLUTION RESPIRATORY (INHALATION) at 15:43

## 2022-11-18 RX ADMIN — TAZOBACTAM SODIUM AND PIPERACILLIN SODIUM 3.38 G: 375; 3 INJECTION, SOLUTION INTRAVENOUS at 10:27

## 2022-11-18 RX ADMIN — VANCOMYCIN HYDROCHLORIDE 1000 MG: 1 INJECTION, SOLUTION INTRAVENOUS at 00:23

## 2022-11-18 RX ADMIN — LOSARTAN POTASSIUM 25 MG: 25 TABLET, FILM COATED ORAL at 10:26

## 2022-11-18 RX ADMIN — TAZOBACTAM SODIUM AND PIPERACILLIN SODIUM 3.38 G: 375; 3 INJECTION, SOLUTION INTRAVENOUS at 18:07

## 2022-11-18 RX ADMIN — ACETAMINOPHEN 1000 MG: 500 TABLET ORAL at 20:24

## 2022-11-18 RX ADMIN — ESCITALOPRAM OXALATE 20 MG: 20 TABLET ORAL at 20:24

## 2022-11-18 RX ADMIN — TAZOBACTAM SODIUM AND PIPERACILLIN SODIUM 3.38 G: 375; 3 INJECTION, SOLUTION INTRAVENOUS at 01:58

## 2022-11-18 RX ADMIN — DEXAMETHASONE SODIUM PHOSPHATE 4 MG: 4 INJECTION INTRA-ARTICULAR; INTRALESIONAL; INTRAMUSCULAR; INTRAVENOUS; SOFT TISSUE at 13:54

## 2022-11-18 RX ADMIN — ATORVASTATIN CALCIUM 10 MG: 10 TABLET, FILM COATED ORAL at 20:24

## 2022-11-18 RX ADMIN — GUAIFENESIN AND DEXTROMETHORPHAN HYDROBROMIDE 2 TABLET: 600; 30 TABLET, EXTENDED RELEASE ORAL at 20:24

## 2022-11-18 RX ADMIN — QUETIAPINE FUMARATE 75 MG: 25 TABLET ORAL at 20:24

## 2022-11-18 RX ADMIN — VENLAFAXINE HYDROCHLORIDE 225 MG: 75 CAPSULE, EXTENDED RELEASE ORAL at 10:28

## 2022-11-18 RX ADMIN — GUAIFENESIN AND DEXTROMETHORPHAN HYDROBROMIDE 2 TABLET: 600; 30 TABLET, EXTENDED RELEASE ORAL at 10:26

## 2022-11-18 RX ADMIN — OXYCODONE 10 MG: 5 TABLET ORAL at 18:16

## 2022-11-18 RX ADMIN — ALBUTEROL SULFATE 2.5 MG: 2.5 SOLUTION RESPIRATORY (INHALATION) at 09:12

## 2022-11-18 RX ADMIN — OXYCODONE 10 MG: 5 TABLET ORAL at 00:26

## 2022-11-19 ENCOUNTER — APPOINTMENT (OUTPATIENT)
Dept: GENERAL RADIOLOGY | Facility: HOSPITAL | Age: 67
End: 2022-11-19

## 2022-11-19 LAB — MAGNESIUM SERPL-MCNC: 2.1 MG/DL (ref 1.6–2.4)

## 2022-11-19 PROCEDURE — 25010000002 PIPERACILLIN SOD-TAZOBACTAM PER 1 G: Performed by: INTERNAL MEDICINE

## 2022-11-19 PROCEDURE — 63710000001 LOSARTAN 25 MG TABLET: Performed by: INTERNAL MEDICINE

## 2022-11-19 PROCEDURE — 94761 N-INVAS EAR/PLS OXIMETRY MLT: CPT

## 2022-11-19 PROCEDURE — 94664 DEMO&/EVAL PT USE INHALER: CPT

## 2022-11-19 PROCEDURE — 63710000001 VENLAFAXINE XR 75 MG CAPSULE SUSTAINED-RELEASE 24 HR: Performed by: NURSE PRACTITIONER

## 2022-11-19 PROCEDURE — 63710000001 GUAIFENESIN-DEXTROMETHORPHAN 30-600 MG TABLET SUSTAINED-RELEASE 12 HOUR: Performed by: INTERNAL MEDICINE

## 2022-11-19 PROCEDURE — 25010000002 DEXAMETHASONE PER 1 MG: Performed by: INTERNAL MEDICINE

## 2022-11-19 PROCEDURE — A9270 NON-COVERED ITEM OR SERVICE: HCPCS | Performed by: INTERNAL MEDICINE

## 2022-11-19 PROCEDURE — A9270 NON-COVERED ITEM OR SERVICE: HCPCS | Performed by: NURSE PRACTITIONER

## 2022-11-19 PROCEDURE — 97116 GAIT TRAINING THERAPY: CPT

## 2022-11-19 PROCEDURE — 99233 SBSQ HOSP IP/OBS HIGH 50: CPT | Performed by: INTERNAL MEDICINE

## 2022-11-19 PROCEDURE — 94799 UNLISTED PULMONARY SVC/PX: CPT

## 2022-11-19 PROCEDURE — 71045 X-RAY EXAM CHEST 1 VIEW: CPT

## 2022-11-19 PROCEDURE — 97535 SELF CARE MNGMENT TRAINING: CPT

## 2022-11-19 PROCEDURE — 63710000001 HYDROCOD POLST-CPM POLST ER 10-8 MG/5ML SUSPENSION EXTENDED RELEASE: Performed by: INTERNAL MEDICINE

## 2022-11-19 PROCEDURE — 97110 THERAPEUTIC EXERCISES: CPT

## 2022-11-19 PROCEDURE — 63710000001 OXYCODONE 5 MG TABLET: Performed by: INTERNAL MEDICINE

## 2022-11-19 PROCEDURE — 83735 ASSAY OF MAGNESIUM: CPT | Performed by: INTERNAL MEDICINE

## 2022-11-19 PROCEDURE — 99024 POSTOP FOLLOW-UP VISIT: CPT | Performed by: ORTHOPAEDIC SURGERY

## 2022-11-19 PROCEDURE — 63710000001 ACETAMINOPHEN 500 MG TABLET: Performed by: INTERNAL MEDICINE

## 2022-11-19 PROCEDURE — 63710000001 DEXAMETHASONE PER 0.25 MG: Performed by: INTERNAL MEDICINE

## 2022-11-19 PROCEDURE — 63710000001 CETIRIZINE 10 MG TABLET: Performed by: INTERNAL MEDICINE

## 2022-11-19 RX ORDER — POLYETHYLENE GLYCOL 3350 17 G/17G
17 POWDER, FOR SOLUTION ORAL DAILY PRN
Status: DISCONTINUED | OUTPATIENT
Start: 2022-11-19 | End: 2022-11-21 | Stop reason: HOSPADM

## 2022-11-19 RX ORDER — AMOXICILLIN AND CLAVULANATE POTASSIUM 875; 125 MG/1; MG/1
1 TABLET, FILM COATED ORAL EVERY 12 HOURS SCHEDULED
Status: COMPLETED | OUTPATIENT
Start: 2022-11-19 | End: 2022-11-20

## 2022-11-19 RX ORDER — DOXYCYCLINE 100 MG/1
100 CAPSULE ORAL EVERY 12 HOURS SCHEDULED
Status: DISCONTINUED | OUTPATIENT
Start: 2022-11-20 | End: 2022-11-21 | Stop reason: HOSPADM

## 2022-11-19 RX ORDER — DEXAMETHASONE 4 MG/1
4 TABLET ORAL EVERY 8 HOURS SCHEDULED
Status: COMPLETED | OUTPATIENT
Start: 2022-11-19 | End: 2022-11-21

## 2022-11-19 RX ORDER — BISACODYL 10 MG
10 SUPPOSITORY, RECTAL RECTAL DAILY PRN
Status: DISCONTINUED | OUTPATIENT
Start: 2022-11-19 | End: 2022-11-21 | Stop reason: HOSPADM

## 2022-11-19 RX ORDER — AMOXICILLIN 250 MG
2 CAPSULE ORAL 2 TIMES DAILY
Status: DISCONTINUED | OUTPATIENT
Start: 2022-11-19 | End: 2022-11-21 | Stop reason: HOSPADM

## 2022-11-19 RX ORDER — BISACODYL 5 MG/1
5 TABLET, DELAYED RELEASE ORAL DAILY PRN
Status: DISCONTINUED | OUTPATIENT
Start: 2022-11-19 | End: 2022-11-21 | Stop reason: HOSPADM

## 2022-11-19 RX ADMIN — DEXAMETHASONE 4 MG: 4 TABLET ORAL at 21:43

## 2022-11-19 RX ADMIN — RACEPINEPHRINE HYDROCHLORIDE 0.5 ML: 11.25 SOLUTION RESPIRATORY (INHALATION) at 19:15

## 2022-11-19 RX ADMIN — DEXAMETHASONE SODIUM PHOSPHATE 4 MG: 4 INJECTION INTRA-ARTICULAR; INTRALESIONAL; INTRAMUSCULAR; INTRAVENOUS; SOFT TISSUE at 05:56

## 2022-11-19 RX ADMIN — ACETAMINOPHEN 1000 MG: 500 TABLET ORAL at 05:56

## 2022-11-19 RX ADMIN — SENNOSIDES AND DOCUSATE SODIUM 2 TABLET: 50; 8.6 TABLET ORAL at 13:18

## 2022-11-19 RX ADMIN — IPRATROPIUM BROMIDE AND ALBUTEROL SULFATE 3 ML: .5; 3 SOLUTION RESPIRATORY (INHALATION) at 08:56

## 2022-11-19 RX ADMIN — HYDROCODONE POLISTIREX AND CHLORPHENIRAMINE POLISTIREX 5 ML: 10; 8 SUSPENSION, EXTENDED RELEASE ORAL at 02:05

## 2022-11-19 RX ADMIN — CETIRIZINE HYDROCHLORIDE 10 MG: 10 TABLET, FILM COATED ORAL at 09:42

## 2022-11-19 RX ADMIN — SENNOSIDES AND DOCUSATE SODIUM 2 TABLET: 50; 8.6 TABLET ORAL at 19:28

## 2022-11-19 RX ADMIN — IPRATROPIUM BROMIDE AND ALBUTEROL SULFATE 3 ML: .5; 3 SOLUTION RESPIRATORY (INHALATION) at 21:54

## 2022-11-19 RX ADMIN — LABETALOL HYDROCHLORIDE 10 MG: 5 INJECTION, SOLUTION INTRAVENOUS at 04:41

## 2022-11-19 RX ADMIN — ATORVASTATIN CALCIUM 10 MG: 10 TABLET, FILM COATED ORAL at 19:28

## 2022-11-19 RX ADMIN — OXYCODONE 10 MG: 5 TABLET ORAL at 09:55

## 2022-11-19 RX ADMIN — TAZOBACTAM SODIUM AND PIPERACILLIN SODIUM 3.38 G: 375; 3 INJECTION, SOLUTION INTRAVENOUS at 02:05

## 2022-11-19 RX ADMIN — LOSARTAN POTASSIUM 25 MG: 25 TABLET, FILM COATED ORAL at 09:42

## 2022-11-19 RX ADMIN — ACETAMINOPHEN 1000 MG: 500 TABLET ORAL at 21:43

## 2022-11-19 RX ADMIN — DOXYCYCLINE 100 MG: 100 INJECTION, POWDER, LYOPHILIZED, FOR SOLUTION INTRAVENOUS at 05:56

## 2022-11-19 RX ADMIN — ACETAMINOPHEN 1000 MG: 500 TABLET ORAL at 13:18

## 2022-11-19 RX ADMIN — VENLAFAXINE HYDROCHLORIDE 225 MG: 75 CAPSULE, EXTENDED RELEASE ORAL at 09:42

## 2022-11-19 RX ADMIN — TAZOBACTAM SODIUM AND PIPERACILLIN SODIUM 3.38 G: 375; 3 INJECTION, SOLUTION INTRAVENOUS at 09:42

## 2022-11-19 RX ADMIN — GUAIFENESIN AND DEXTROMETHORPHAN HYDROBROMIDE 2 TABLET: 600; 30 TABLET, EXTENDED RELEASE ORAL at 09:42

## 2022-11-19 RX ADMIN — ESCITALOPRAM OXALATE 20 MG: 20 TABLET ORAL at 19:28

## 2022-11-19 RX ADMIN — AMOXICILLIN AND CLAVULANATE POTASSIUM 1 TABLET: 875; 125 TABLET, FILM COATED ORAL at 18:02

## 2022-11-19 RX ADMIN — OXYCODONE 10 MG: 5 TABLET ORAL at 15:46

## 2022-11-19 RX ADMIN — HYDROCODONE POLISTIREX AND CHLORPHENIRAMINE POLISTIREX 5 ML: 10; 8 SUSPENSION, EXTENDED RELEASE ORAL at 13:18

## 2022-11-19 RX ADMIN — DEXAMETHASONE SODIUM PHOSPHATE 4 MG: 4 INJECTION INTRA-ARTICULAR; INTRALESIONAL; INTRAMUSCULAR; INTRAVENOUS; SOFT TISSUE at 13:18

## 2022-11-19 RX ADMIN — QUETIAPINE FUMARATE 75 MG: 25 TABLET ORAL at 19:27

## 2022-11-19 RX ADMIN — DOXYCYCLINE 100 MG: 100 INJECTION, POWDER, LYOPHILIZED, FOR SOLUTION INTRAVENOUS at 16:23

## 2022-11-19 RX ADMIN — OXYCODONE 10 MG: 5 TABLET ORAL at 03:55

## 2022-11-19 RX ADMIN — GUAIFENESIN AND DEXTROMETHORPHAN HYDROBROMIDE 2 TABLET: 600; 30 TABLET, EXTENDED RELEASE ORAL at 19:27

## 2022-11-20 ENCOUNTER — APPOINTMENT (OUTPATIENT)
Dept: CARDIOLOGY | Facility: HOSPITAL | Age: 67
End: 2022-11-20

## 2022-11-20 LAB
ANION GAP SERPL CALCULATED.3IONS-SCNC: 10 MMOL/L (ref 5–15)
BASOPHILS # BLD AUTO: 0.02 10*3/MM3 (ref 0–0.2)
BASOPHILS NFR BLD AUTO: 0.2 % (ref 0–1.5)
BH CV ECHO MEAS - AO MAX PG: 9.2 MMHG
BH CV ECHO MEAS - AO MEAN PG: 4 MMHG
BH CV ECHO MEAS - AO ROOT DIAM: 2.5 CM
BH CV ECHO MEAS - AO V2 MAX: 152 CM/SEC
BH CV ECHO MEAS - AO V2 VTI: 30.6 CM
BH CV ECHO MEAS - AVA(I,D): 2.8 CM2
BH CV ECHO MEAS - EDV(CUBED): 117.6 ML
BH CV ECHO MEAS - EDV(MOD-SP2): 78.4 ML
BH CV ECHO MEAS - EDV(MOD-SP4): 58.7 ML
BH CV ECHO MEAS - EF(MOD-BP): 57.7 %
BH CV ECHO MEAS - EF(MOD-SP2): 58.8 %
BH CV ECHO MEAS - EF(MOD-SP4): 59.5 %
BH CV ECHO MEAS - ESV(CUBED): 42.9 ML
BH CV ECHO MEAS - ESV(MOD-SP2): 32.3 ML
BH CV ECHO MEAS - ESV(MOD-SP4): 23.8 ML
BH CV ECHO MEAS - FS: 28.6 %
BH CV ECHO MEAS - IVS/LVPW: 1.13 CM
BH CV ECHO MEAS - IVSD: 0.9 CM
BH CV ECHO MEAS - LA DIMENSION: 3.3 CM
BH CV ECHO MEAS - LAT PEAK E' VEL: 15.9 CM/SEC
BH CV ECHO MEAS - LV MASS(C)D: 141.9 GRAMS
BH CV ECHO MEAS - LV MAX PG: 5.7 MMHG
BH CV ECHO MEAS - LV MEAN PG: 3 MMHG
BH CV ECHO MEAS - LV V1 MAX: 119 CM/SEC
BH CV ECHO MEAS - LV V1 VTI: 27.4 CM
BH CV ECHO MEAS - LVIDD: 4.9 CM
BH CV ECHO MEAS - LVIDS: 3.5 CM
BH CV ECHO MEAS - LVOT AREA: 3.1 CM2
BH CV ECHO MEAS - LVOT DIAM: 2 CM
BH CV ECHO MEAS - LVPWD: 0.8 CM
BH CV ECHO MEAS - MED PEAK E' VEL: 9.1 CM/SEC
BH CV ECHO MEAS - MV A MAX VEL: 86.5 CM/SEC
BH CV ECHO MEAS - MV DEC SLOPE: 262 CM/SEC2
BH CV ECHO MEAS - MV DEC TIME: 0.32 MSEC
BH CV ECHO MEAS - MV E MAX VEL: 84.9 CM/SEC
BH CV ECHO MEAS - MV E/A: 0.98
BH CV ECHO MEAS - MV MAX PG: 5.7 MMHG
BH CV ECHO MEAS - MV MEAN PG: 2 MMHG
BH CV ECHO MEAS - MV V2 VTI: 33.7 CM
BH CV ECHO MEAS - MVA(VTI): 2.6 CM2
BH CV ECHO MEAS - PA ACC TIME: 0.18 SEC
BH CV ECHO MEAS - PA PR(ACCEL): -1.77 MMHG
BH CV ECHO MEAS - PA V2 MAX: 120.5 CM/SEC
BH CV ECHO MEAS - RAP SYSTOLE: 8 MMHG
BH CV ECHO MEAS - RVSP: 31 MMHG
BH CV ECHO MEAS - SV(LVOT): 86.1 ML
BH CV ECHO MEAS - SV(MOD-SP2): 46.1 ML
BH CV ECHO MEAS - SV(MOD-SP4): 34.9 ML
BH CV ECHO MEAS - TAPSE (>1.6): 1.88 CM
BH CV ECHO MEAS - TR MAX PG: 23.5 MMHG
BH CV ECHO MEAS - TR MAX VEL: 242.5 CM/SEC
BH CV ECHO MEASUREMENTS AVERAGE E/E' RATIO: 6.79
BH CV VAS BP RIGHT ARM: NORMAL MMHG
BH CV XLRA - RV BASE: 3.2 CM
BH CV XLRA - RV LENGTH: 6.2 CM
BH CV XLRA - RV MID: 2.9 CM
BH CV XLRA - TDI S': 11.9 CM/SEC
BUN SERPL-MCNC: 20 MG/DL (ref 8–23)
BUN/CREAT SERPL: 31.3 (ref 7–25)
CALCIUM SPEC-SCNC: 9.1 MG/DL (ref 8.6–10.5)
CHLORIDE SERPL-SCNC: 101 MMOL/L (ref 98–107)
CO2 SERPL-SCNC: 27 MMOL/L (ref 22–29)
CREAT SERPL-MCNC: 0.64 MG/DL (ref 0.57–1)
DEPRECATED RDW RBC AUTO: 49.3 FL (ref 37–54)
EGFRCR SERPLBLD CKD-EPI 2021: 97.6 ML/MIN/1.73
EOSINOPHIL # BLD AUTO: 0.01 10*3/MM3 (ref 0–0.4)
EOSINOPHIL NFR BLD AUTO: 0.1 % (ref 0.3–6.2)
ERYTHROCYTE [DISTWIDTH] IN BLOOD BY AUTOMATED COUNT: 14.5 % (ref 12.3–15.4)
GLUCOSE SERPL-MCNC: 101 MG/DL (ref 65–99)
HCT VFR BLD AUTO: 37.1 % (ref 34–46.6)
HGB BLD-MCNC: 12 G/DL (ref 12–15.9)
IMM GRANULOCYTES # BLD AUTO: 0.11 10*3/MM3 (ref 0–0.05)
IMM GRANULOCYTES NFR BLD AUTO: 0.8 % (ref 0–0.5)
LEFT ATRIUM VOLUME INDEX: 19.8 ML/M2
LYMPHOCYTES # BLD AUTO: 1.63 10*3/MM3 (ref 0.7–3.1)
LYMPHOCYTES NFR BLD AUTO: 12.4 % (ref 19.6–45.3)
MAXIMAL PREDICTED HEART RATE: 154 BPM
MCH RBC QN AUTO: 30.2 PG (ref 26.6–33)
MCHC RBC AUTO-ENTMCNC: 32.3 G/DL (ref 31.5–35.7)
MCV RBC AUTO: 93.2 FL (ref 79–97)
MONOCYTES # BLD AUTO: 1.21 10*3/MM3 (ref 0.1–0.9)
MONOCYTES NFR BLD AUTO: 9.2 % (ref 5–12)
NEUTROPHILS NFR BLD AUTO: 10.17 10*3/MM3 (ref 1.7–7)
NEUTROPHILS NFR BLD AUTO: 77.3 % (ref 42.7–76)
NRBC BLD AUTO-RTO: 0 /100 WBC (ref 0–0.2)
NT-PROBNP SERPL-MCNC: 447.8 PG/ML (ref 0–900)
PLATELET # BLD AUTO: 315 10*3/MM3 (ref 140–450)
PMV BLD AUTO: 9.7 FL (ref 6–12)
POTASSIUM SERPL-SCNC: 3.9 MMOL/L (ref 3.5–5.2)
PROCALCITONIN SERPL-MCNC: 0.06 NG/ML (ref 0–0.25)
RBC # BLD AUTO: 3.98 10*6/MM3 (ref 3.77–5.28)
SODIUM SERPL-SCNC: 138 MMOL/L (ref 136–145)
STRESS TARGET HR: 131 BPM
TROPONIN T SERPL-MCNC: <0.01 NG/ML (ref 0–0.03)
WBC NRBC COR # BLD: 13.15 10*3/MM3 (ref 3.4–10.8)

## 2022-11-20 PROCEDURE — 97110 THERAPEUTIC EXERCISES: CPT

## 2022-11-20 PROCEDURE — 85025 COMPLETE CBC W/AUTO DIFF WBC: CPT | Performed by: INTERNAL MEDICINE

## 2022-11-20 PROCEDURE — 94761 N-INVAS EAR/PLS OXIMETRY MLT: CPT

## 2022-11-20 PROCEDURE — 99232 SBSQ HOSP IP/OBS MODERATE 35: CPT | Performed by: INTERNAL MEDICINE

## 2022-11-20 PROCEDURE — 80048 BASIC METABOLIC PNL TOTAL CA: CPT | Performed by: INTERNAL MEDICINE

## 2022-11-20 PROCEDURE — 84484 ASSAY OF TROPONIN QUANT: CPT | Performed by: INTERNAL MEDICINE

## 2022-11-20 PROCEDURE — 99024 POSTOP FOLLOW-UP VISIT: CPT | Performed by: ORTHOPAEDIC SURGERY

## 2022-11-20 PROCEDURE — 97116 GAIT TRAINING THERAPY: CPT

## 2022-11-20 PROCEDURE — 94799 UNLISTED PULMONARY SVC/PX: CPT

## 2022-11-20 PROCEDURE — 94664 DEMO&/EVAL PT USE INHALER: CPT

## 2022-11-20 PROCEDURE — 93306 TTE W/DOPPLER COMPLETE: CPT

## 2022-11-20 PROCEDURE — 93306 TTE W/DOPPLER COMPLETE: CPT | Performed by: INTERNAL MEDICINE

## 2022-11-20 PROCEDURE — 83880 ASSAY OF NATRIURETIC PEPTIDE: CPT | Performed by: INTERNAL MEDICINE

## 2022-11-20 PROCEDURE — 97535 SELF CARE MNGMENT TRAINING: CPT

## 2022-11-20 PROCEDURE — 63710000001 DEXAMETHASONE PER 0.25 MG: Performed by: INTERNAL MEDICINE

## 2022-11-20 PROCEDURE — 84145 PROCALCITONIN (PCT): CPT | Performed by: INTERNAL MEDICINE

## 2022-11-20 RX ORDER — CLONIDINE HYDROCHLORIDE 0.1 MG/1
0.1 TABLET ORAL EVERY 8 HOURS PRN
Status: DISCONTINUED | OUTPATIENT
Start: 2022-11-20 | End: 2022-11-21 | Stop reason: HOSPADM

## 2022-11-20 RX ORDER — CLONIDINE HYDROCHLORIDE 0.1 MG/1
0.1 TABLET ORAL 3 TIMES DAILY PRN
Status: DISCONTINUED | OUTPATIENT
Start: 2022-11-20 | End: 2022-11-20

## 2022-11-20 RX ORDER — LOSARTAN POTASSIUM 50 MG/1
50 TABLET ORAL
Status: DISCONTINUED | OUTPATIENT
Start: 2022-11-21 | End: 2022-11-21 | Stop reason: HOSPADM

## 2022-11-20 RX ADMIN — IPRATROPIUM BROMIDE AND ALBUTEROL SULFATE 3 ML: .5; 3 SOLUTION RESPIRATORY (INHALATION) at 13:46

## 2022-11-20 RX ADMIN — HYDROCODONE POLISTIREX AND CHLORPHENIRAMINE POLISTIREX 5 ML: 10; 8 SUSPENSION, EXTENDED RELEASE ORAL at 05:06

## 2022-11-20 RX ADMIN — LOSARTAN POTASSIUM 25 MG: 25 TABLET, FILM COATED ORAL at 08:02

## 2022-11-20 RX ADMIN — OXYCODONE 10 MG: 5 TABLET ORAL at 12:35

## 2022-11-20 RX ADMIN — AMOXICILLIN AND CLAVULANATE POTASSIUM 1 TABLET: 875; 125 TABLET, FILM COATED ORAL at 07:57

## 2022-11-20 RX ADMIN — CLONIDINE HYDROCHLORIDE 0.1 MG: 0.1 TABLET ORAL at 09:14

## 2022-11-20 RX ADMIN — CETIRIZINE HYDROCHLORIDE 10 MG: 10 TABLET, FILM COATED ORAL at 07:57

## 2022-11-20 RX ADMIN — VENLAFAXINE HYDROCHLORIDE 225 MG: 75 CAPSULE, EXTENDED RELEASE ORAL at 08:02

## 2022-11-20 RX ADMIN — ACETAMINOPHEN 1000 MG: 500 TABLET ORAL at 21:29

## 2022-11-20 RX ADMIN — IPRATROPIUM BROMIDE AND ALBUTEROL SULFATE 3 ML: .5; 3 SOLUTION RESPIRATORY (INHALATION) at 08:58

## 2022-11-20 RX ADMIN — DEXAMETHASONE 4 MG: 4 TABLET ORAL at 05:06

## 2022-11-20 RX ADMIN — IPRATROPIUM BROMIDE AND ALBUTEROL SULFATE 3 ML: .5; 3 SOLUTION RESPIRATORY (INHALATION) at 15:41

## 2022-11-20 RX ADMIN — IPRATROPIUM BROMIDE AND ALBUTEROL SULFATE 3 ML: .5; 3 SOLUTION RESPIRATORY (INHALATION) at 20:27

## 2022-11-20 RX ADMIN — ALBUTEROL SULFATE 2.5 MG: 2.5 SOLUTION RESPIRATORY (INHALATION) at 02:33

## 2022-11-20 RX ADMIN — ATORVASTATIN CALCIUM 10 MG: 10 TABLET, FILM COATED ORAL at 20:14

## 2022-11-20 RX ADMIN — OXYCODONE 10 MG: 5 TABLET ORAL at 20:22

## 2022-11-20 RX ADMIN — DOXYCYCLINE 100 MG: 100 CAPSULE ORAL at 07:57

## 2022-11-20 RX ADMIN — ACETAMINOPHEN 1000 MG: 500 TABLET ORAL at 14:40

## 2022-11-20 RX ADMIN — QUETIAPINE FUMARATE 75 MG: 25 TABLET ORAL at 20:11

## 2022-11-20 RX ADMIN — ACETAMINOPHEN 1000 MG: 500 TABLET ORAL at 05:06

## 2022-11-20 RX ADMIN — HYDROCODONE POLISTIREX AND CHLORPHENIRAMINE POLISTIREX 5 ML: 10; 8 SUSPENSION, EXTENDED RELEASE ORAL at 16:23

## 2022-11-20 RX ADMIN — GUAIFENESIN AND DEXTROMETHORPHAN HYDROBROMIDE 2 TABLET: 600; 30 TABLET, EXTENDED RELEASE ORAL at 20:12

## 2022-11-20 RX ADMIN — SENNOSIDES AND DOCUSATE SODIUM 2 TABLET: 50; 8.6 TABLET ORAL at 07:58

## 2022-11-20 RX ADMIN — GUAIFENESIN AND DEXTROMETHORPHAN HYDROBROMIDE 2 TABLET: 600; 30 TABLET, EXTENDED RELEASE ORAL at 07:57

## 2022-11-20 RX ADMIN — RIZATRIPTAN BENZOATE 10 MG: 10 TABLET, ORALLY DISINTEGRATING ORAL at 09:15

## 2022-11-20 RX ADMIN — SENNOSIDES AND DOCUSATE SODIUM 2 TABLET: 50; 8.6 TABLET ORAL at 20:11

## 2022-11-20 RX ADMIN — DEXAMETHASONE 4 MG: 4 TABLET ORAL at 21:29

## 2022-11-20 RX ADMIN — OXYCODONE 10 MG: 5 TABLET ORAL at 16:23

## 2022-11-20 RX ADMIN — DEXAMETHASONE 4 MG: 4 TABLET ORAL at 14:40

## 2022-11-20 RX ADMIN — OXYCODONE 10 MG: 5 TABLET ORAL at 07:56

## 2022-11-20 RX ADMIN — RIZATRIPTAN BENZOATE 10 MG: 10 TABLET, ORALLY DISINTEGRATING ORAL at 14:42

## 2022-11-20 RX ADMIN — DOXYCYCLINE 100 MG: 100 CAPSULE ORAL at 20:12

## 2022-11-20 RX ADMIN — ESCITALOPRAM OXALATE 20 MG: 20 TABLET ORAL at 20:14

## 2022-11-21 ENCOUNTER — APPOINTMENT (OUTPATIENT)
Dept: GENERAL RADIOLOGY | Facility: HOSPITAL | Age: 67
End: 2022-11-21

## 2022-11-21 ENCOUNTER — READMISSION MANAGEMENT (OUTPATIENT)
Dept: CALL CENTER | Facility: HOSPITAL | Age: 67
End: 2022-11-21

## 2022-11-21 VITALS
BODY MASS INDEX: 38.85 KG/M2 | OXYGEN SATURATION: 95 % | TEMPERATURE: 98.1 F | HEIGHT: 62 IN | RESPIRATION RATE: 18 BRPM | WEIGHT: 211.09 LBS | SYSTOLIC BLOOD PRESSURE: 151 MMHG | HEART RATE: 55 BPM | DIASTOLIC BLOOD PRESSURE: 79 MMHG

## 2022-11-21 LAB
ANION GAP SERPL CALCULATED.3IONS-SCNC: 11 MMOL/L (ref 5–15)
BASOPHILS # BLD AUTO: 0.03 10*3/MM3 (ref 0–0.2)
BASOPHILS NFR BLD AUTO: 0.2 % (ref 0–1.5)
BUN SERPL-MCNC: 23 MG/DL (ref 8–23)
BUN/CREAT SERPL: 34.8 (ref 7–25)
CALCIUM SPEC-SCNC: 9 MG/DL (ref 8.6–10.5)
CHLORIDE SERPL-SCNC: 104 MMOL/L (ref 98–107)
CO2 SERPL-SCNC: 22 MMOL/L (ref 22–29)
CREAT SERPL-MCNC: 0.66 MG/DL (ref 0.57–1)
CRP SERPL-MCNC: 0.62 MG/DL (ref 0–0.5)
DEPRECATED RDW RBC AUTO: 49.7 FL (ref 37–54)
EGFRCR SERPLBLD CKD-EPI 2021: 96.9 ML/MIN/1.73
EOSINOPHIL # BLD AUTO: 0.02 10*3/MM3 (ref 0–0.4)
EOSINOPHIL NFR BLD AUTO: 0.1 % (ref 0.3–6.2)
ERYTHROCYTE [DISTWIDTH] IN BLOOD BY AUTOMATED COUNT: 14.6 % (ref 12.3–15.4)
GLUCOSE SERPL-MCNC: 116 MG/DL (ref 65–99)
HCT VFR BLD AUTO: 35.6 % (ref 34–46.6)
HGB BLD-MCNC: 11.6 G/DL (ref 12–15.9)
IMM GRANULOCYTES # BLD AUTO: 0.23 10*3/MM3 (ref 0–0.05)
IMM GRANULOCYTES NFR BLD AUTO: 1.7 % (ref 0–0.5)
LYMPHOCYTES # BLD AUTO: 1.65 10*3/MM3 (ref 0.7–3.1)
LYMPHOCYTES NFR BLD AUTO: 11.9 % (ref 19.6–45.3)
MCH RBC QN AUTO: 30.2 PG (ref 26.6–33)
MCHC RBC AUTO-ENTMCNC: 32.6 G/DL (ref 31.5–35.7)
MCV RBC AUTO: 92.7 FL (ref 79–97)
MONOCYTES # BLD AUTO: 1.35 10*3/MM3 (ref 0.1–0.9)
MONOCYTES NFR BLD AUTO: 9.7 % (ref 5–12)
NEUTROPHILS NFR BLD AUTO: 10.59 10*3/MM3 (ref 1.7–7)
NEUTROPHILS NFR BLD AUTO: 76.4 % (ref 42.7–76)
NRBC BLD AUTO-RTO: 0 /100 WBC (ref 0–0.2)
PLATELET # BLD AUTO: 304 10*3/MM3 (ref 140–450)
PMV BLD AUTO: 9.8 FL (ref 6–12)
POTASSIUM SERPL-SCNC: 4.2 MMOL/L (ref 3.5–5.2)
RBC # BLD AUTO: 3.84 10*6/MM3 (ref 3.77–5.28)
SODIUM SERPL-SCNC: 137 MMOL/L (ref 136–145)
WBC NRBC COR # BLD: 13.87 10*3/MM3 (ref 3.4–10.8)

## 2022-11-21 PROCEDURE — 97110 THERAPEUTIC EXERCISES: CPT

## 2022-11-21 PROCEDURE — 94664 DEMO&/EVAL PT USE INHALER: CPT

## 2022-11-21 PROCEDURE — 85025 COMPLETE CBC W/AUTO DIFF WBC: CPT | Performed by: INTERNAL MEDICINE

## 2022-11-21 PROCEDURE — 71045 X-RAY EXAM CHEST 1 VIEW: CPT

## 2022-11-21 PROCEDURE — 97535 SELF CARE MNGMENT TRAINING: CPT

## 2022-11-21 PROCEDURE — 63710000001 DEXAMETHASONE PER 0.25 MG: Performed by: INTERNAL MEDICINE

## 2022-11-21 PROCEDURE — 80048 BASIC METABOLIC PNL TOTAL CA: CPT | Performed by: INTERNAL MEDICINE

## 2022-11-21 PROCEDURE — 94761 N-INVAS EAR/PLS OXIMETRY MLT: CPT

## 2022-11-21 PROCEDURE — 94799 UNLISTED PULMONARY SVC/PX: CPT

## 2022-11-21 PROCEDURE — 97116 GAIT TRAINING THERAPY: CPT

## 2022-11-21 PROCEDURE — 86140 C-REACTIVE PROTEIN: CPT | Performed by: INTERNAL MEDICINE

## 2022-11-21 PROCEDURE — 99232 SBSQ HOSP IP/OBS MODERATE 35: CPT | Performed by: INTERNAL MEDICINE

## 2022-11-21 RX ORDER — OXYCODONE HYDROCHLORIDE 5 MG/1
5 TABLET ORAL EVERY 4 HOURS PRN
Qty: 25 TABLET | Refills: 0 | Status: SHIPPED | OUTPATIENT
Start: 2022-11-21 | End: 2022-11-30

## 2022-11-21 RX ORDER — DEXAMETHASONE 2 MG/1
4 TABLET ORAL 2 TIMES DAILY WITH MEALS
Qty: 12 TABLET | Refills: 0 | Status: SHIPPED | OUTPATIENT
Start: 2022-11-21 | End: 2022-11-21 | Stop reason: SDUPTHER

## 2022-11-21 RX ORDER — POLYETHYLENE GLYCOL 3350 17 G/17G
17 POWDER, FOR SOLUTION ORAL DAILY
Qty: 238 G | Refills: 0 | Status: SHIPPED | OUTPATIENT
Start: 2022-11-21 | End: 2022-12-06

## 2022-11-21 RX ORDER — DEXAMETHASONE 2 MG/1
2 TABLET ORAL 2 TIMES DAILY WITH MEALS
Qty: 6 TABLET | Refills: 0 | Status: SHIPPED | OUTPATIENT
Start: 2022-11-25 | End: 2022-11-21 | Stop reason: SDUPTHER

## 2022-11-21 RX ORDER — DEXAMETHASONE 2 MG/1
4 TABLET ORAL 2 TIMES DAILY WITH MEALS
Qty: 12 TABLET | Refills: 0 | Status: SHIPPED | OUTPATIENT
Start: 2022-11-21 | End: 2022-11-24

## 2022-11-21 RX ORDER — GUAIFENESIN AND DEXTROMETHORPHAN HYDROBROMIDE 600; 30 MG/1; MG/1
2 TABLET, EXTENDED RELEASE ORAL 2 TIMES DAILY
Qty: 20 TABLET | Refills: 0 | Status: SHIPPED | OUTPATIENT
Start: 2022-11-21 | End: 2022-11-21 | Stop reason: SDUPTHER

## 2022-11-21 RX ORDER — GUAIFENESIN AND DEXTROMETHORPHAN HYDROBROMIDE 600; 30 MG/1; MG/1
2 TABLET, EXTENDED RELEASE ORAL 2 TIMES DAILY
Qty: 20 TABLET | Refills: 0 | Status: SHIPPED | OUTPATIENT
Start: 2022-11-21 | End: 2023-03-31

## 2022-11-21 RX ORDER — DEXAMETHASONE 2 MG/1
2 TABLET ORAL 2 TIMES DAILY WITH MEALS
Qty: 6 TABLET | Refills: 0 | Status: SHIPPED | OUTPATIENT
Start: 2022-11-25 | End: 2022-11-28

## 2022-11-21 RX ADMIN — IPRATROPIUM BROMIDE AND ALBUTEROL SULFATE 3 ML: .5; 3 SOLUTION RESPIRATORY (INHALATION) at 09:21

## 2022-11-21 RX ADMIN — ACETAMINOPHEN 1000 MG: 500 TABLET ORAL at 14:29

## 2022-11-21 RX ADMIN — GUAIFENESIN AND DEXTROMETHORPHAN HYDROBROMIDE 2 TABLET: 600; 30 TABLET, EXTENDED RELEASE ORAL at 09:13

## 2022-11-21 RX ADMIN — DEXAMETHASONE 4 MG: 4 TABLET ORAL at 05:33

## 2022-11-21 RX ADMIN — HYDROCODONE POLISTIREX AND CHLORPHENIRAMINE POLISTIREX 5 ML: 10; 8 SUSPENSION, EXTENDED RELEASE ORAL at 05:33

## 2022-11-21 RX ADMIN — OXYCODONE 10 MG: 5 TABLET ORAL at 07:38

## 2022-11-21 RX ADMIN — CETIRIZINE HYDROCHLORIDE 10 MG: 10 TABLET, FILM COATED ORAL at 09:13

## 2022-11-21 RX ADMIN — VENLAFAXINE HYDROCHLORIDE 225 MG: 75 CAPSULE, EXTENDED RELEASE ORAL at 09:13

## 2022-11-21 RX ADMIN — SENNOSIDES AND DOCUSATE SODIUM 2 TABLET: 50; 8.6 TABLET ORAL at 09:14

## 2022-11-21 RX ADMIN — ACETAMINOPHEN 1000 MG: 500 TABLET ORAL at 05:33

## 2022-11-21 RX ADMIN — CLONIDINE HYDROCHLORIDE 0.1 MG: 0.1 TABLET ORAL at 07:38

## 2022-11-21 RX ADMIN — DOXYCYCLINE 100 MG: 100 CAPSULE ORAL at 09:13

## 2022-11-21 RX ADMIN — OXYCODONE 10 MG: 5 TABLET ORAL at 02:24

## 2022-11-21 RX ADMIN — OXYCODONE 10 MG: 5 TABLET ORAL at 12:03

## 2022-11-22 LAB
BACTERIA SPEC AEROBE CULT: NORMAL
BACTERIA SPEC AEROBE CULT: NORMAL

## 2022-11-22 NOTE — OUTREACH NOTE
Prep Survey    Flowsheet Row Responses   [facility] patient discharged from? [facility]   Is LACE score < 7 ? No   Emergency Room discharge w/ pulse ox? No   Eligibility Readm Mgmt   Discharge diagnosis ORIF left scapula nonunion, removal of deep implants, postop hypoxia & multifactorial possible PNA   Does the patient have one of the following disease processes/diagnoses(primary or secondary)? General Surgery   Does the patient have Home health ordered? No   Is there a DME ordered? No   Prep survey completed? Yes          KLEVER SEAY - Registered Nurse

## 2022-11-25 ENCOUNTER — READMISSION MANAGEMENT (OUTPATIENT)
Dept: CALL CENTER | Facility: HOSPITAL | Age: 67
End: 2022-11-25

## 2022-11-25 NOTE — OUTREACH NOTE
General Surgery Week 1 Survey    Flowsheet Row Responses   [facility] patient discharged from? [facility]   Does the patient have one of the following disease processes/diagnoses(primary or secondary)? General Surgery   Week 1 attempt successful? Yes   Call start time 1618   Call end time 1622   Discharge diagnosis ORIF left scapula nonunion, removal of deep implants, postop hypoxia & multifactorial possible PNA   Person spoke with today (if not patient) and relationship patient   Meds reviewed with patient/caregiver? Yes   Is the patient having any side effects they believe may be caused by any medication additions or changes? No   Does the patient have all medications related to this admission filled (includes all antibiotics, pain medications, etc.) Yes   Is the patient taking all medications as directed (includes completed medication regime)? Yes   Does the patient have a follow up appointment scheduled with their surgeon? Yes   Has the patient kept scheduled appointments due by today? Yes   Comments Pt states she has all f/u appts made   Psychosocial issues? No   Did the patient receive a copy of their discharge instructions? Yes   Nursing interventions Reviewed instructions with patient   What is the patient's perception of their health status since discharge? Improving   Is the patient/caregiver able to teach back signs and symptoms of incisional infection? Increased redness, swelling or pain at the incisonal site, Increased drainage or bleeding, Incisional warmth, Pus or odor from incision, Fever   Is the patient/caregiver able to teach back steps to recovery at home? Set small, achievable goals for return to baseline health, Rest and rebuild strength, gradually increase activity   If the patient is a current smoker, are they able to teach back resources for cessation? Not a smoker   Is the patient/caregiver able to teach back the hierarchy of who to call/visit for symptoms/problems? PCP, Specialist,  Home health nurse, Urgent Care, ED, 911 Yes   Additional teach back comments SO is able to see incision and is monitoring daily for s/sx of infection.  Pt denies any of these symptoms at this time   Week 1 call completed? Yes   Wrap up additional comments Pt states she is doing very well.  She is very happy with the care/calls she has recieved.          IMELDA BACK - Registered Nurse

## 2022-12-05 NOTE — PROGRESS NOTES
"Enter Query Response Below      Query Response:     nonunion of fracture not due to complication of hardware         If applicable, please update the problem list.        Patient: Perla Adair         : 1955  Account: [id number]           Admit Date: 2022        How to Respond to this query:       a. Click New Note     b. Answer query within the yellow box.                c. Update the Problem List, if applicable.      If you have any questions about this query contact me at: [email or web address]     Dr. 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  She  underwent previous open reduction internal fixation but had displacement of her fracture and subsequent nonunion.\"  Patient underwent ORIF left scapula and \"removal of deep implants.\"  Interval H&P dated  includes reference to \"full history and physical note from office\" and there is a document scanned into Epic and labeled History and Physical, however that document is illegible.     Can this be further specified as:    - nonunion of fracture due to mechanical complication of hardware  - nonunion of fracture not due to complication of hardware  - other ________________________________  - clinically indeterminable    By submitting this query, we are merely seeking further clarification of documentation to accurately reflect all conditions that you are monitoring, evaluating, treating or that extend the hospitalization or utilize additional resources of care. Please utilize your independent clinical judgment when addressing the question(s) above.     This query and your response, once completed, will be entered into the legal medical record.    Sincerely,  Samantha Arguello RN CCDS CCS  Clinical Documentation " Integrity Program

## 2022-12-05 NOTE — PROGRESS NOTES
"Enter Query Response Below      Query Response:       clinically indeterminable       If applicable, please update the problem list.        Patient: Perla Adair         : 1955  Account: [id number]           Admit Date: 2022        How to Respond to this query:       a. Click New Note     b. Answer query within the yellow box.                c. Update the Problem List, if applicable.      If you have any questions about this query contact me at: [email or web address]     Dr. 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  She  underwent previous open reduction internal fixation but had displacement of her fracture and subsequent nonunion.\"  Patient underwent ORIF left scapula and \"removal of deep implants.\"  Interval H&P dated  includes reference to \"full history and physical note from office\" and there is a document scanned into Epic and labeled History and Physical, however that document is illegible.     Can this be further specified as:    - left displaced acromial fracture originally due to reverse total shoulder arthroplasty   - left displaced acromial fracture originally due to trauma after reverse total shoulder arthroplasty  - other __________________________________  - clinically indeterminable    By submitting this query, we are merely seeking further clarification of documentation to accurately reflect all conditions that you are monitoring, evaluating, treating or that extend the hospitalization or utilize additional resources of care. Please utilize your independent clinical judgment when addressing the question(s) above.     This query and your response, once completed, will be entered into the legal medical record.    Sincerely,  Samantha Arguello RN " CCDS [facility]  Clinical Documentation Integrity Program

## 2023-03-31 RX ORDER — VALSARTAN 40 MG/1
40 TABLET ORAL DAILY
COMMUNITY

## 2023-04-10 ENCOUNTER — HOSPITAL ENCOUNTER (OUTPATIENT)
Facility: HOSPITAL | Age: 68
Setting detail: HOSPITAL OUTPATIENT SURGERY
Discharge: HOME | End: 2023-04-10
Attending: ORTHOPAEDIC SURGERY | Admitting: ORTHOPAEDIC SURGERY
Payer: MEDICARE

## 2023-04-10 ENCOUNTER — ANESTHESIA EVENT CONVERTED (OUTPATIENT)
Dept: ANESTHESIOLOGY | Facility: HOSPITAL | Age: 68
End: 2023-04-10
Payer: MEDICARE

## 2023-04-10 ENCOUNTER — ANESTHESIA (OUTPATIENT)
Dept: PERIOP | Facility: HOSPITAL | Age: 68
End: 2023-04-10
Payer: MEDICARE

## 2023-04-10 ENCOUNTER — ANESTHESIA EVENT (OUTPATIENT)
Dept: PERIOP | Facility: HOSPITAL | Age: 68
End: 2023-04-10
Payer: MEDICARE

## 2023-04-10 VITALS
RESPIRATION RATE: 16 BRPM | HEIGHT: 62 IN | DIASTOLIC BLOOD PRESSURE: 72 MMHG | HEART RATE: 79 BPM | TEMPERATURE: 98.2 F | BODY MASS INDEX: 38.64 KG/M2 | SYSTOLIC BLOOD PRESSURE: 96 MMHG | WEIGHT: 210 LBS | OXYGEN SATURATION: 96 %

## 2023-04-10 LAB
QT INTERVAL: 484 MS
QTC INTERVAL: 533 MS

## 2023-04-10 PROCEDURE — 25010000002 ROPIVACAINE PER 1 MG: Performed by: NURSE ANESTHETIST, CERTIFIED REGISTERED

## 2023-04-10 PROCEDURE — 25010000002 FENTANYL CITRATE (PF) 50 MCG/ML SOLUTION

## 2023-04-10 PROCEDURE — 25010000002 VANCOMYCIN 10 G RECONSTITUTED SOLUTION: Performed by: ORTHOPAEDIC SURGERY

## 2023-04-10 PROCEDURE — 25010000002 DEXAMETHASONE PER 1 MG: Performed by: NURSE ANESTHETIST, CERTIFIED REGISTERED

## 2023-04-10 PROCEDURE — C1889 IMPLANT/INSERT DEVICE, NOC: HCPCS | Performed by: ORTHOPAEDIC SURGERY

## 2023-04-10 PROCEDURE — 25010000002 CEFAZOLIN IN DEXTROSE 2-4 GM/100ML-% SOLUTION: Performed by: ORTHOPAEDIC SURGERY

## 2023-04-10 PROCEDURE — 93010 ELECTROCARDIOGRAM REPORT: CPT | Performed by: STUDENT IN AN ORGANIZED HEALTH CARE EDUCATION/TRAINING PROGRAM

## 2023-04-10 PROCEDURE — 25010000002 HYDROMORPHONE 1 MG/ML SOLUTION

## 2023-04-10 PROCEDURE — 93005 ELECTROCARDIOGRAM TRACING: CPT | Performed by: ANESTHESIOLOGY

## 2023-04-10 PROCEDURE — 25010000002 FENTANYL CITRATE (PF) 50 MCG/ML SOLUTION: Performed by: NURSE ANESTHETIST, CERTIFIED REGISTERED

## 2023-04-10 PROCEDURE — 25010000002 ONDANSETRON PER 1 MG: Performed by: NURSE ANESTHETIST, CERTIFIED REGISTERED

## 2023-04-10 PROCEDURE — 25010000002 PROPOFOL 10 MG/ML EMULSION: Performed by: NURSE ANESTHETIST, CERTIFIED REGISTERED

## 2023-04-10 DEVICE — SUT FW #2 W/TPR NDL 1/2 CIR 38IN 97CM 26.5MM BLU: Type: IMPLANTABLE DEVICE | Site: SHOULDER | Status: FUNCTIONAL

## 2023-04-10 RX ORDER — FAMOTIDINE 20 MG/1
20 TABLET, FILM COATED ORAL
Status: COMPLETED | OUTPATIENT
Start: 2023-04-10 | End: 2023-04-10

## 2023-04-10 RX ORDER — MONTELUKAST SODIUM 10 MG/1
10 TABLET ORAL NIGHTLY
COMMUNITY

## 2023-04-10 RX ORDER — ROSUVASTATIN CALCIUM 40 MG/1
40 TABLET, COATED ORAL DAILY
COMMUNITY

## 2023-04-10 RX ORDER — LIDOCAINE HYDROCHLORIDE 10 MG/ML
INJECTION, SOLUTION EPIDURAL; INFILTRATION; INTRACAUDAL; PERINEURAL AS NEEDED
Status: DISCONTINUED | OUTPATIENT
Start: 2023-04-10 | End: 2023-04-10 | Stop reason: SURG

## 2023-04-10 RX ORDER — SODIUM CHLORIDE 9 MG/ML
40 INJECTION, SOLUTION INTRAVENOUS AS NEEDED
Status: DISCONTINUED | OUTPATIENT
Start: 2023-04-10 | End: 2023-04-10 | Stop reason: HOSPADM

## 2023-04-10 RX ORDER — DULOXETIN HYDROCHLORIDE 60 MG/1
60 CAPSULE, DELAYED RELEASE ORAL DAILY
COMMUNITY

## 2023-04-10 RX ORDER — FENTANYL CITRATE 50 UG/ML
INJECTION, SOLUTION INTRAMUSCULAR; INTRAVENOUS
Status: COMPLETED
Start: 2023-04-10 | End: 2023-04-10

## 2023-04-10 RX ORDER — SODIUM CHLORIDE, SODIUM LACTATE, POTASSIUM CHLORIDE, CALCIUM CHLORIDE 600; 310; 30; 20 MG/100ML; MG/100ML; MG/100ML; MG/100ML
9 INJECTION, SOLUTION INTRAVENOUS CONTINUOUS PRN
Status: DISCONTINUED | OUTPATIENT
Start: 2023-04-10 | End: 2023-04-10 | Stop reason: HOSPADM

## 2023-04-10 RX ORDER — CETIRIZINE HYDROCHLORIDE 10 MG/1
10 TABLET ORAL DAILY
COMMUNITY

## 2023-04-10 RX ORDER — MAGNESIUM HYDROXIDE 1200 MG/15ML
LIQUID ORAL AS NEEDED
Status: DISCONTINUED | OUTPATIENT
Start: 2023-04-10 | End: 2023-04-10 | Stop reason: HOSPADM

## 2023-04-10 RX ORDER — DEXAMETHASONE SODIUM PHOSPHATE 4 MG/ML
INJECTION, SOLUTION INTRA-ARTICULAR; INTRALESIONAL; INTRAMUSCULAR; INTRAVENOUS; SOFT TISSUE AS NEEDED
Status: DISCONTINUED | OUTPATIENT
Start: 2023-04-10 | End: 2023-04-10 | Stop reason: SURG

## 2023-04-10 RX ORDER — FENTANYL CITRATE 50 UG/ML
50 INJECTION, SOLUTION INTRAMUSCULAR; INTRAVENOUS
Status: DISCONTINUED | OUTPATIENT
Start: 2023-04-10 | End: 2023-04-10 | Stop reason: HOSPADM

## 2023-04-10 RX ORDER — LIDOCAINE HYDROCHLORIDE 10 MG/ML
0.5 INJECTION, SOLUTION EPIDURAL; INFILTRATION; INTRACAUDAL; PERINEURAL ONCE AS NEEDED
Status: COMPLETED | OUTPATIENT
Start: 2023-04-10 | End: 2023-04-10

## 2023-04-10 RX ORDER — RIZATRIPTAN BENZOATE 10 MG/1
10 TABLET ORAL ONCE AS NEEDED
COMMUNITY

## 2023-04-10 RX ORDER — ONDANSETRON 2 MG/ML
INJECTION INTRAMUSCULAR; INTRAVENOUS AS NEEDED
Status: DISCONTINUED | OUTPATIENT
Start: 2023-04-10 | End: 2023-04-10 | Stop reason: SURG

## 2023-04-10 RX ORDER — BUPIVACAINE HYDROCHLORIDE 2.5 MG/ML
INJECTION, SOLUTION EPIDURAL; INFILTRATION; INTRACAUDAL
Status: COMPLETED | OUTPATIENT
Start: 2023-04-10 | End: 2023-04-10

## 2023-04-10 RX ORDER — ROPIVACAINE HYDROCHLORIDE 2 MG/ML
INJECTION, SOLUTION EPIDURAL; INFILTRATION; PERINEURAL CONTINUOUS
Status: DISCONTINUED | OUTPATIENT
Start: 2023-04-10 | End: 2023-04-10 | Stop reason: HOSPADM

## 2023-04-10 RX ORDER — FENTANYL CITRATE 50 UG/ML
INJECTION, SOLUTION INTRAMUSCULAR; INTRAVENOUS
Status: COMPLETED | OUTPATIENT
Start: 2023-04-10 | End: 2023-04-10

## 2023-04-10 RX ORDER — PROPOFOL 10 MG/ML
VIAL (ML) INTRAVENOUS AS NEEDED
Status: DISCONTINUED | OUTPATIENT
Start: 2023-04-10 | End: 2023-04-10 | Stop reason: SURG

## 2023-04-10 RX ORDER — CEFAZOLIN SODIUM 2 G/100ML
2 INJECTION, SOLUTION INTRAVENOUS ONCE
Status: COMPLETED | OUTPATIENT
Start: 2023-04-10 | End: 2023-04-10

## 2023-04-10 RX ORDER — EPHEDRINE SULFATE 50 MG/ML
INJECTION INTRAVENOUS AS NEEDED
Status: DISCONTINUED | OUTPATIENT
Start: 2023-04-10 | End: 2023-04-10 | Stop reason: SURG

## 2023-04-10 RX ORDER — ROCURONIUM BROMIDE 10 MG/ML
INJECTION, SOLUTION INTRAVENOUS AS NEEDED
Status: DISCONTINUED | OUTPATIENT
Start: 2023-04-10 | End: 2023-04-10 | Stop reason: SURG

## 2023-04-10 RX ORDER — SODIUM CHLORIDE 0.9 % (FLUSH) 0.9 %
10 SYRINGE (ML) INJECTION AS NEEDED
Status: DISCONTINUED | OUTPATIENT
Start: 2023-04-10 | End: 2023-04-10 | Stop reason: HOSPADM

## 2023-04-10 RX ORDER — BUPIVACAINE HCL/0.9 % NACL/PF 0.125 %
PLASTIC BAG, INJECTION (ML) EPIDURAL AS NEEDED
Status: DISCONTINUED | OUTPATIENT
Start: 2023-04-10 | End: 2023-04-10 | Stop reason: SURG

## 2023-04-10 RX ORDER — MIDAZOLAM HYDROCHLORIDE 1 MG/ML
0.5 INJECTION INTRAMUSCULAR; INTRAVENOUS
Status: DISCONTINUED | OUTPATIENT
Start: 2023-04-10 | End: 2023-04-10 | Stop reason: HOSPADM

## 2023-04-10 RX ORDER — SODIUM CHLORIDE 0.9 % (FLUSH) 0.9 %
10 SYRINGE (ML) INJECTION EVERY 12 HOURS SCHEDULED
Status: DISCONTINUED | OUTPATIENT
Start: 2023-04-10 | End: 2023-04-10 | Stop reason: HOSPADM

## 2023-04-10 RX ADMIN — PROPOFOL 200 MG: 10 INJECTION, EMULSION INTRAVENOUS at 10:16

## 2023-04-10 RX ADMIN — CEFAZOLIN SODIUM 2 G: 2 INJECTION, SOLUTION INTRAVENOUS at 09:12

## 2023-04-10 RX ADMIN — FENTANYL CITRATE 50 MCG: 50 INJECTION, SOLUTION INTRAMUSCULAR; INTRAVENOUS at 12:12

## 2023-04-10 RX ADMIN — BUPIVACAINE HYDROCHLORIDE 7 ML: 2.5 INJECTION, SOLUTION EPIDURAL; INFILTRATION; INTRACAUDAL at 09:01

## 2023-04-10 RX ADMIN — EPHEDRINE SULFATE 15 MG: 50 INJECTION INTRAVENOUS at 10:32

## 2023-04-10 RX ADMIN — Medication 100 MCG: at 10:38

## 2023-04-10 RX ADMIN — HYDROMORPHONE HYDROCHLORIDE 0.5 MG: 1 INJECTION, SOLUTION INTRAMUSCULAR; INTRAVENOUS; SUBCUTANEOUS at 11:48

## 2023-04-10 RX ADMIN — ONDANSETRON 4 MG: 2 INJECTION INTRAMUSCULAR; INTRAVENOUS at 10:54

## 2023-04-10 RX ADMIN — SUGAMMADEX 300 MG: 100 INJECTION, SOLUTION INTRAVENOUS at 11:04

## 2023-04-10 RX ADMIN — FAMOTIDINE 20 MG: 20 TABLET, FILM COATED ORAL at 08:31

## 2023-04-10 RX ADMIN — VANCOMYCIN HYDROCHLORIDE 1500 MG: 10 INJECTION, POWDER, LYOPHILIZED, FOR SOLUTION INTRAVENOUS at 09:49

## 2023-04-10 RX ADMIN — EPHEDRINE SULFATE 15 MG: 50 INJECTION INTRAVENOUS at 10:33

## 2023-04-10 RX ADMIN — DEXAMETHASONE SODIUM PHOSPHATE 8 MG: 4 INJECTION, SOLUTION INTRAMUSCULAR; INTRAVENOUS at 10:19

## 2023-04-10 RX ADMIN — LIDOCAINE HYDROCHLORIDE 0.5 ML: 10 INJECTION, SOLUTION EPIDURAL; INFILTRATION; INTRACAUDAL; PERINEURAL at 08:30

## 2023-04-10 RX ADMIN — Medication 0.5 MG: at 11:48

## 2023-04-10 RX ADMIN — SODIUM CHLORIDE, POTASSIUM CHLORIDE, SODIUM LACTATE AND CALCIUM CHLORIDE 9 ML/HR: 600; 310; 30; 20 INJECTION, SOLUTION INTRAVENOUS at 08:30

## 2023-04-10 RX ADMIN — Medication 1000 MG: at 11:31

## 2023-04-10 RX ADMIN — FENTANYL CITRATE 100 MCG: 50 INJECTION, SOLUTION INTRAMUSCULAR; INTRAVENOUS at 09:01

## 2023-04-10 RX ADMIN — LIDOCAINE HYDROCHLORIDE 50 MG: 10 INJECTION, SOLUTION EPIDURAL; INFILTRATION; INTRACAUDAL; PERINEURAL at 10:16

## 2023-04-10 RX ADMIN — Medication 200 MCG: at 11:09

## 2023-04-10 RX ADMIN — ROCURONIUM BROMIDE 50 MG: 10 INJECTION, SOLUTION INTRAVENOUS at 10:16

## 2023-04-10 RX ADMIN — Medication 200 MCG: at 10:27
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
## 2023-04-10 NOTE — DISCHARGE INSTRUCTIONS
InfuBLOCK - Patient Information    What is a pain pump?  InfuBLOCK is a postoperative, non-narcotic pain relief system that delivers local anesthetic to or near the surgical site. This is a pain minimizing therapy that delivers an anesthetic (numbing) medicine to the nerve.    The InfuBLOCK pain pump will continuously deliver a local anesthetic medication to block the pain in the area of your procedure.    Where can I find information about my pain pump?           For more information about your pain pump, scan the QR code.  For additional patient resources, visit [email or web address]-pain-management.                                                                                             The [employer] Nursing Hotline is Here for You 24/7.     Call [phone] for Assistance.  While your physician is your primary source for information about your treatment., there may be times during your treatment that you need assistance with your infusion pump. Our team of compassionate and knowledgeable Registered Nursed (RN) is here to assist every step of the way.    Answers to questions about your infusion pump                 Tubing disconnect  Assistance with pump alarms                                                      Dislodged catheter  Excessive leakage noted from pump                                         Inadequate pain control   Nerve Catheter Removal Instructions  When your device is empty:    Remove your catheter by pulling the dressing off slowly (like you would remove a regular bandage). The catheter should pull right out of the skin.  Check that the BLUE tip is intact.                                                                                     If the catheter is stuck, reposition your   extremity and pull slowly until removed.  *If catheter is HURTING and WON'T come out, stop and call [phone] for further assistance.    Remove medication bag from the black carrying case.  Cut the  tubing on right and left side of pump, and discard the medication bag and tubing into garbage.  Place the pump and black carrying case into the plastic bag and then place this into the return box.  Seal box with blue stickers and return to [place] postal service.    THIS IS PRE-PAID POSTAGE.

## 2023-04-10 NOTE — ANESTHESIA PROCEDURE NOTES
Airway  Urgency: elective    Date/Time: 4/10/2023 10:17 AM  Airway not difficult    General Information and Staff    Patient location during procedure: OR  CRNA/CAA: Aung Ga CRNA    Indications and Patient Condition  Indications for airway management: airway protection    Preoxygenated: yes  MILS not maintained throughout  Mask difficulty assessment: 1 - vent by mask    Final Airway Details  Final airway type: endotracheal airway      Successful airway: ETT  Cuffed: yes   Successful intubation technique: direct laryngoscopy  Facilitating devices/methods: intubating stylet  Endotracheal tube insertion site: oral  Blade: Sherman  Blade size: 3  ETT size (mm): 7.0  Cormack-Lehane Classification: grade I - full view of glottis  Placement verified by: chest auscultation and capnometry   Measured from: lips  ETT/EBT  to lips (cm): 20  Number of attempts at approach: 1  Assessment: lips, teeth, and gum same as pre-op and atraumatic intubation    Additional Comments  Negative epigastric sounds, Breath sound equal bilaterally with symmetric chest rise and fall

## 2023-04-10 NOTE — ANESTHESIA PROCEDURE NOTES
Interscalene Block    Pre-sedation assessment completed: 4/10/2023 8:08 AM    Patient reassessed immediately prior to procedure    Patient location during procedure: pre-op  Start time: 4/10/2023 8:08 AM  Reason for block: at surgeon's request and post-op pain management  Performed by  CRNA/CAA: Mark Reyna, CRNA  Assisted by: Tressa Costello RN  Preanesthetic Checklist  Completed: patient identified, IV checked, site marked, risks and benefits discussed, surgical consent, monitors and equipment checked, pre-op evaluation and timeout performed  Prep:  Sterile barriers:cap, gloves, mask and washed/disinfected hands  Prep: ChloraPrep  Patient monitoring: blood pressure monitoring, continuous pulse oximetry and EKG  Procedure    Sedation: yes  Performed under: local infiltration  Guidance:ultrasound guided    ULTRASOUND INTERPRETATION.  Using ultrasound guidance a 20 G gauge needle was placed in close proximity to the nerve, at which point, under ultrasound guidance anesthetic was injected in the area of the nerve and spread of the anesthesia was seen on ultrasound in close proximity thereto.  There were no abnormalities seen on ultrasound; a digital image was taken; and the patient tolerated the procedure with no complications. Images:still images obtained, printed/placed on chart    Laterality:left  Block Type:interscalene  Injection Technique:catheter  Needle Type:Tuohy and echogenic  Needle Gauge:18 G  Resistance on Injection: none  Catheter Size:20 G (20g)  Cath Depth at skin: 9 cm    Medications Used: bupivacaine PF (MARCAINE) 0.25 % injection - Injection   7 mL - 4/10/2023 9:01:00 AM  fentaNYL citrate (PF) (SUBLIMAZE) injection - Intravenous   100 mcg - 4/10/2023 9:01:00 AM      Medications  Preservative Free Saline:10ml  Comment:Pt with prior hx of resp difficulty after surgery/block left shoulder, xray shows right elevated diaghragm, resting o2 sat 100%, surgeon request cath for pain control, pt with hx  "chronic pain/med .  Discuss with pt high potential for resp compromise with ISB block vs lesser pain control with short term blocks/less resp issues. Pt agrees to ISB and possible cath d/c if resp issues.  Thank you    Post Assessment  Injection Assessment: negative aspiration for heme, no paresthesia on injection and incremental injection  Patient Tolerance:comfortable throughout block  Complications:no  Additional Notes  CATHETER  A high-frequency linear transducer, with sterile cover, was placed in the supraclavicular fossa to identify the subclavian artery and trunks and divisions of the brachial plexus. The transducer was then moved in a cephalad orientation with a slight rotation to continue visualization of the brachial plexus from the trunks and divisions, on to the C5-C7 roots. The insertion site was prepped and draped in sterile fashion. Skin and cutaneous tissue was infiltrated with 2-5 ml of 1% Lidocaine. Using ultrasound-guidance, an 18-gauge Contiplex Ultra 360 Touhy needle was advanced in plane from lateral to medial. Preservative-free normal saline was utilized for hydro-dissection of tissue, advancement of Touhy, and to confirm final needle placement at the fascial plane between the middle scalene muscle and sheath of the brachial plexus (C5-C7). A 20-gauge Contiplex Echo catheter was placed through the needle and advance out the tip of the Touhy 3-5 cm with the \"Petersen Flip\". The Touhy needle was then removed, and final catheter position verified lateral to the brachial plexus with local anesthetic (LA) and ultrasound visualization. The catheter was secured in the usual fashion with skin glue, benzoin, steri-strips, CHG tegaderm and label noting \"Nerve Block Catheter\". Jerk tape applied at yellow connector and catheter connection. All LA was injected in increments of 3-5 ml after catheter secured. Aspiration every 5 ml to prevent intravascular injection. Injection was completed with negative " aspiration of blood and negative intravascular injection. Injection pressures were normal with minimal resistance.

## 2023-04-10 NOTE — ANESTHESIA POSTPROCEDURE EVALUATION
"Patient: Perla Adair    Procedure Summary     Date: 04/10/23 Room / Location:  [facility] /  [facility]    Anesthesia Start: 1011 Anesthesia Stop: 1131    Procedure: REMOVAL OF DEEP IMPLANTS - LEFT SHOULDER (Left: Arm Upper) Diagnosis:       Fracture of left scapula with nonunion      (Fracture of left scapula with nonunion [085345])    Surgeons: Carson Ashley MD Provider: Donnell Pepe MD    Anesthesia Type: general with block ASA Status: 3          Anesthesia Type: general with block    Vitals  No vitals data found for the desired time range.          Post Anesthesia Care and Evaluation    Patient location during evaluation: PACU  Patient participation: complete - patient participated  Level of consciousness: awake and responsive to verbal stimuli  Pain score: 2  Pain management: adequate    Airway patency: patent  Anesthetic complications: No anesthetic complications    Cardiovascular status: acceptable  Respiratory status: acceptable  Hydration status: acceptable    Comments: Pt awake and responsive. SV. VSS. Report to RN. Patient Vitals in the past 24 hrs:  04/10/23 0808, BP:139/78, Temp:97.3 °F (36.3 °C), Temp src:Temporal, Pulse:73, Resp:20, SpO2:98 %, Height:157.5 cm (62\"), Weight:95.3 kg (210 lb)  133/78. p 72. r 16. t 98.1                  "

## 2023-04-10 NOTE — OP NOTE
DATE OF OPERATION: 04/10/23  PREOPERATIVE DIAGNOSIS: Fracture of left scapula with nonunion [105178]  POSTOPERATIVE DIAGNOSES:  1. Fracture of left scapula with nonunion [984138]  PROCEDURES PERFORMED:  1.  Removal of deep implants  SURGEON: Carson Ashley MD  ASSISTANTS:  1.  Sixto Maloney DO, PGY 6 sports fellow  ANESTHESIA: General plus block.    ESTIMATED BLOOD LOSS: Minimal mL.    COMPLICATIONS: None.    DISPOSITION: Recovery room in stable condition.    INDICATIONS: This is a 67-year-old female with left shoulder pain secondary to an acromial fracture.  She has undergone internal fixation as well as revision internal fixation with 2 plates and by CT scan has persistent nonunion with pain attributable to the plates.  At this point after discussion of risk benefits and alternatives she just wished to have the plate removed for pain relief.  She understands function may not improve at all.  DESCRIPTION OF PROCEDURE: On the day of surgery, she identified the left shoulder as the correct operative extremity. This was initialed by the surgeon with the patients's acknowledgment. The patient underwent placement of an interscalene block and was taken to the operating room and placed in the supine position. Upon induction of adequate anesthesia, the patient was brought up to the beach chair position and the shoulder and upper extremity were prepped and draped in the usual sterile fashion. Timeout confirmed the correct patient and operative extremity as well as that antibiotics were on board.  Her previous incision posteriorly along the scapular spine and acromion was utilized after sharply through the skin.  Hemostasis was obtained and the plates were identified and subperiosteal dissection using Bovie used to expose the plates.  The superior plate and the posterior/lateral plate were both removed after removal of all screws.  The bone quality was quite poor.  A rigorous deltoid repair laterally and posteriorly was  carried out through drill holes in the acromion using #2 FiberWire.  This was carried out after copious irrigation of the wound with saline mixed with Betadine.  The fascia was approximated with 0 Vicryl, the subcutaneous tissue with 2-0 Vicryl, and the skin with Monocryl and Dermabond. A sterile dressing was placed. Anesthesia was reversed and the patient was taken to the recovery room in stable condition. All instrument, needle, and sponge counts were correct.      Carson Ashley MD*

## 2023-04-10 NOTE — INTERVAL H&P NOTE
"[facility] Pre-op    Full history and physical note from office is attached.    /78 (BP Location: Right arm, Patient Position: Lying)   Pulse 73   Temp 97.3 °F (36.3 °C) (Temporal)   Resp 20   Ht 157.5 cm (62\")   Wt 95.3 kg (210 lb)   SpO2 98%   BMI 38.41 kg/m²     Immunizations:  Influenza:  2022  Pneumococcal:  UTD  Tetanus:  UTD  Covid : x4    Review of Systems:  Constitutional-- No fever, chills or sweats. No fatigue.  CV-- No chest pain, palpitation or syncope  Resp-- No SOB, cough, hemoptysis  Skin--No rashes or lesions    LAB Results:  Lab Results   Component Value Date    WBC 13.87 (H) 11/21/2022    HGB 11.6 (L) 11/21/2022    HCT 35.6 11/21/2022    MCV 92.7 11/21/2022     11/21/2022    NEUTROABS 10.59 (H) 11/21/2022    GLUCOSE 116 (H) 11/21/2022    BUN 23 11/21/2022    CREATININE 0.66 11/21/2022    EGFRIFNONA >60 08/04/2021    EGFRIFAFRI >60 08/04/2021     11/21/2022    K 4.2 11/21/2022     11/21/2022    CO2 22.0 11/21/2022    MG 2.1 11/19/2022    CALCIUM 9.0 11/21/2022    ALBUMIN 4.0 08/04/2021    AST 16 08/04/2021    ALT 15 08/04/2021    BILITOT 0.4 08/04/2021    INR 0.9 08/04/2021       Cancer Staging (if applicable)  Cancer Patient: __ yes __no __unknown__N/A; If yes, clinical stage T:__ N:__M:__, stage group or __N/A      Impression: Closed fracture left scapula with non-union       Plan: REMOVAL OF DEEP IMPLANTS - LEFT SHOULDER      JASON Sanchez   4/10/2023   09:11 EDT   "

## 2023-04-11 NOTE — PROGRESS NOTES
SIENNA Rivas    Nerve Cath Post Op Call    Patient Name: Perla Adair  :  1955  MRN:  [id number]  Date of Discharge: 4/10/2023    Nerve Cath Post Op Call:    Analgesia:Good  Patient/Family instructed to call ON CALL anesthesia provider for any questions or problems.  Patient Follow Up:

## 2023-04-11 NOTE — PROGRESS NOTES
SIENNA Rivas    Nerve Cath Post Op Call    Patient Name: Perla Adair  :  1955  MRN:  [id number]  Date of Discharge: 4/10/2023    Nerve Cath Post Op Call:    Analgesia:Good  Catheter Site:clean  Catheter Plan:Will continue with plan at home without changes  Patient/Family instructed to call ON CALL anesthesia provider for any questions or problems.  Patient Follow Up:    Phone call follow up day of surgery , pt SO reports pt with good pain control, 02 sats 90-97%, pt denies soa. Phone follow up today and pt self reports 02 sats stable , some effort to take deep breath but feels good, good pain control, she says this shoulder surgery much better than last two pain control and ease of breathing.  Pt was instructed again to not compromise safety and that if she was soa or having difficulty breathing to turn off LA infusion device or have pain catheter removed.  Pt reports she is happy with postop course and also has providers phone number and oncall service.  Thank you

## 2023-04-12 NOTE — PROGRESS NOTES
SIENNA Rivas    Nerve Cath Post Op Call    Patient Name: Perla Adair  :  1955  MRN:  [id number]  Date of Discharge: 4/10/2023    Nerve Cath Post Op Call:    Analgesia:Good  Catheter Site:clean  Catheter Plan:Will continue with plan at home without changes and The patient was instructed to call ON CALL Anesthesia provider for any questions or problems  Patient/Family instructed to call ON CALL anesthesia provider for any questions or problems.  Patient Follow Up:    Pt called, reports some soa with exertion but is relieved with rest, pain control good and wishes to keep pain pump in place.  Instructed her that pain pump and catheter needs to be removed on Friday to reduce risk of provider confusion, if pt has a safety issue over the weekend.  Pt states understanding and appreciates follow up and instructions.  Thank  you

## 2023-04-14 ENCOUNTER — PRIOR AUTHORIZATION (OUTPATIENT)
Dept: NEUROLOGY | Facility: CLINIC | Age: 68
End: 2023-04-14
Payer: MEDICARE

## 2023-05-30 RX ORDER — ATOGEPANT 60 MG/1
60 TABLET ORAL DAILY
Qty: 90 TABLET | Refills: 0 | Status: SHIPPED | OUTPATIENT
Start: 2023-05-30 | End: 2023-12-26

## 2023-05-30 NOTE — TELEPHONE ENCOUNTER
Rx Refill Note  Requested Prescriptions     Pending Prescriptions Disp Refills   • Atogepant (Qulipta) 60 MG tablet 90 tablet 0     Sig: Take 1 tablet by mouth Daily for 210 days. Patient will need to schedule office visit with provider for additional refills.      Last filled:Pharmacy requesting 90 day supply. Sent with 0 refills.  Last office visit with prescribing clinician: Visit date not found      Next office visit with prescribing clinician: Visit date not found     Sammy Linton MA  05/30/23, 09:42 EDT

## 2023-09-05 RX ORDER — ATOGEPANT 60 MG/1
60 TABLET ORAL DAILY
Qty: 90 TABLET | Refills: 1 | Status: SHIPPED | OUTPATIENT
Start: 2023-09-05

## 2023-12-15 ENCOUNTER — TELEPHONE (OUTPATIENT)
Dept: NEUROLOGY | Facility: CLINIC | Age: 68
End: 2023-12-15
Payer: MEDICARE

## 2023-12-15 NOTE — TELEPHONE ENCOUNTER
Provider: MARISOL STANFORD MD    Caller: MARY    Relationship to Patient: SELF    Phone Number: [phone]    Reason for Call: PT CALLING REGARDING THE QULIPTA REFILL FOR A 90 DAY SUPPLY.   [facility] TOLD HER PER INSURANCE THEY NEED A NEW PRIOR AUTH FOR THE 90 DAY REFILL.  STATED SHE IS OUT OF THE MEDICATION     When was the patient last seen: 8-31-22    PLEASE ADVISE

## 2024-01-10 RX ORDER — ATOGEPANT 60 MG/1
TABLET ORAL
Qty: 90 TABLET | Refills: 3 | OUTPATIENT
Start: 2024-01-10

## 2024-04-23 RX ORDER — ATOGEPANT 60 MG/1
TABLET ORAL
Qty: 90 TABLET | Refills: 3 | OUTPATIENT
Start: 2024-04-23

## (undated) DEVICE — INTENT TO BE USED WITH SUTURE MATERIAL FOR TISSUE CLOSURE: Brand: RICHARD-ALLAN® NEEDLE 1/2 CIRCLE TAPER

## (undated) DEVICE — KT PUMP INFUBLOCK MDL 2100 PMKITSOLIS

## (undated) DEVICE — BNDG ELAS CO-FLEX SLF ADHR 4IN5YD LF STRL

## (undated) DEVICE — ADHS SKIN PREMIERPRO EXOFIN TOPICAL HI/VISC .5ML

## (undated) DEVICE — BIT DRL QC DIA 2.5X110MM

## (undated) DEVICE — PAD ARMBRD SURG CONVOL 7.5X20X2IN

## (undated) DEVICE — ELECTRD BLD EZ CLN STD 6.5IN

## (undated) DEVICE — BLANKT WARM LOWR/BDY 100X120CM

## (undated) DEVICE — PK MAJ SHLDR SPLT 10

## (undated) DEVICE — SUT MONOCRYL PLS ANTIB UND 3/0  PS1 27IN

## (undated) DEVICE — .045" X 6" ST GUIDE WIRE: Brand: ACUMED

## (undated) DEVICE — GLV SURG SENSICARE PI ORTHO SZ7.5 LF STRL

## (undated) DEVICE — GLV SURG SIGNATURE TOUCH PF LTX 8 STRL BX/50

## (undated) DEVICE — TBG PENCL TELESCP MEGADYNE SMOKE EVAC 10FT

## (undated) DEVICE — PATIENT RETURN ELECTRODE, SINGLE-USE, CONTACT QUALITY MONITORING, ADULT, WITH 9FT CORD, FOR PATIENTS WEIGING OVER 33LBS. (15KG): Brand: MEGADYNE

## (undated) DEVICE — 2.8MM QUICK RELEASE DRILL: Brand: ACUMED

## (undated) DEVICE — POSTN HD UNIV

## (undated) DEVICE — STERILE PVP: Brand: MEDLINE INDUSTRIES, INC.

## (undated) DEVICE — ANTIBACTERIAL UNDYED BRAIDED (POLYGLACTIN 910), SYNTHETIC ABSORBABLE SUTURE: Brand: COATED VICRYL

## (undated) DEVICE — ELECTRD BLD EZ CLN STD 2.5IN

## (undated) DEVICE — DRAPE,SHOULDER,BEACH CHAIR,STERILE: Brand: MEDLINE

## (undated) DEVICE — SNAP KOVER: Brand: UNBRANDED

## (undated) DEVICE — BIT DRL QC W DEPTH MARK 2X140MM

## (undated) DEVICE — Device

## (undated) DEVICE — ARM SLING: Brand: DEROYAL

## (undated) DEVICE — BIT DRL QC CALIB 2.5X135X45MM NS